# Patient Record
(demographics unavailable — no encounter records)

---

## 2024-11-07 NOTE — RISK ASSESSMENT
[Clinical Records] : Clinical Records [Clinical Interview] : Clinical Interview [Residential stability] : residential stability [Relationship stability] : relationship stability [Employment stability] : employment stability [No] : No [None in the patient's lifetime] : None in the patient's lifetime [None Known] : none known [Feeling of being under threat and being unable to control threat] : feeling of being under threat and being unable to control threat [Yes] : yes [Psychotic disorder] : psychotic disorder [Psychosis] : psychosis [History of Impulsivity] : history of impulsivity [Recent inpatient discharge] : recent inpatient discharge [Triggering events leading to humiliation, shame, and/or despair] : triggering events leading to humiliation, shame, and/or despair (e.g. loss of relationship, financial or health status) (real or anticipated) [Identifies reasons for living] : identifies reasons for living [Supportive social network of family or friends] : supportive social network of family or friends [Roman Catholic beliefs] : Scientology beliefs [Cultural, spiritual and/or moral attitudes against suicide] : cultural, spiritual and/or moral attitudes against suicide [Positive therapeutic relationships] : positive therapeutic relationships [Responsibility to children, family, or others] : responsibility to children, family, or others [Engaged in work or school] : engaged in work or school [Affective dysregulation] : affective dysregulation [Engagement in treatment] : engagement in treatment [de-identified] : Patient denies access to firearms.

## 2024-11-07 NOTE — PHYSICAL EXAM
[Well groomed] : well groomed [Cooperative] : cooperative [Depressed] : depressed [Anxious] : anxious [Full] : full [Clear] : clear [Blocked] : blocked [Depressive] : depressive [Paranoid] : paranoid [None Reported] : none reported [Average] : average [WNL] : within normal limits [Linear/Goal Directed] : linear/goal directed [Reference] : reference

## 2024-11-07 NOTE — SOCIAL HISTORY
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 Developmental History: Milestones met within normal limits. Social Supports: Spouse, parents, siblings and friends. Meaningful Activities: Skiing, softball, volleyball, flag football-6 years ago, dancing, not actively engaging in these activities at present time, Race/Ethnicity: /Bulgarian American Sexual Identity: Heterosexual,  to her spouse since 2019. Spirituality/Shinto: Yazidi.    Spiritual Assessment  What is your elayne or belief? Yazidi. Do you consider yourself spiritual or Jehovah's witness? Rastafarian but not practicing but I do pray. Is there something you believe in that gives meaning to your life? God. Is it important in your life? Yes. What influence does it have on how you take care of yourself? Being a good citizen, supporting others. How have your beliefs influenced your behavior during this illness? Helpful, hopeful, brings me moses to help others, that's my selfcare. What role do your beliefs play in regaining your health? Very much so, always put God first. Are you part of a spiritual or Jehovah's witness community? No. Is this of support to you and how? N/A Is there a person or group of people you really love or who are really important to you? , parents, siblings, son all supportive. We have been discussing your belief and supports. What else gives you internal support? Praying, happiness, doing good deeds. What are your sources of hope, strength, comfort and peace? My family, my mom is Worship and reminds me to pray, I went to [employer] school. What do you hold on to during difficult times? God, guardian kevin, nixon, Geraldine mother of God, I have a Geraldine medal my mom gave me.

## 2024-11-07 NOTE — HEALTH RISK ASSESSMENT
[Patient/Caregiver not ready to engage] : , patient/caregiver not ready to engage [Patient/Collateral not ready to engage] : , patient/collateral not ready to engage [AdvancecareDate] : 11/6/24 [] : 11/6/24

## 2024-11-07 NOTE — RISK ASSESSMENT
[Clinical Records] : Clinical Records [Clinical Interview] : Clinical Interview [Residential stability] : residential stability [Relationship stability] : relationship stability [Employment stability] : employment stability [No] : No [None in the patient's lifetime] : None in the patient's lifetime [None Known] : none known [Feeling of being under threat and being unable to control threat] : feeling of being under threat and being unable to control threat [Yes] : yes [Psychotic disorder] : psychotic disorder [Psychosis] : psychosis [History of Impulsivity] : history of impulsivity [Recent inpatient discharge] : recent inpatient discharge [Triggering events leading to humiliation, shame, and/or despair] : triggering events leading to humiliation, shame, and/or despair (e.g. loss of relationship, financial or health status) (real or anticipated) [Identifies reasons for living] : identifies reasons for living [Supportive social network of family or friends] : supportive social network of family or friends [Restorationism beliefs] : Caodaism beliefs [Cultural, spiritual and/or moral attitudes against suicide] : cultural, spiritual and/or moral attitudes against suicide [Positive therapeutic relationships] : positive therapeutic relationships [Responsibility to children, family, or others] : responsibility to children, family, or others [Engaged in work or school] : engaged in work or school [Affective dysregulation] : affective dysregulation [Engagement in treatment] : engagement in treatment [de-identified] : Patient denies access to firearms.

## 2024-11-07 NOTE — PSYCHOSOCIAL ASSESSMENT
[None known] : None known [Competitive and integrated employment] : Competitive and integrated employment [N/A] : n/a [Other: _____] : [unfilled] [35 hours or more] : 35 hours or more [Financially stable] : financially stable [None] : none [Spouse/Partner] : spouse/partner [Mother] : mother [Good] : good [Lives with Family Member] : lives with family member [No] : Patient attended school, home tutoring, or received education instruction at anytime in the past three months? No [Earned income] : earned income [Private residence (home, apartment, rooming house, hotel, motel, supported housing, supported Single Room Occupancy (SRO),] : Private residence (home, apartment, rooming house, hotel, motel, supported housing, supported Single Room Occupancy (SRO), permanent housing programs, transient housing programs, and shelter plus care housing) [Client's spouse or domestic partner] : client's spouse or domestic partner [Yes] : yes [FreeTextEntry2] : N/A [had nightmares about the event(s) or thought about the event(s) when you did not want] : did not have nightmares and/or unwanted thoughts about the events [tried hard not to think about the event(s) or went out of your way to avoid situations that reminded you of the event] : did not need to avoid thinking about events, did not need to avoid situations that might remind patient of events [has been constantly on guard, watchful, or easily startled] : has not been constantly on guard, watchful, or easily startled [felt numb or detached from people, activities, or your surrounding] : has not felt numb or detached from people, activities, or surroundings [felt guilty or unable to stop blaming yourself or others for the event(s) or any problems the event(s) may have caused] : has not felt guilty or unable to stop blaming self or others for event(s), or any problems the event(s) may have caused [FreeTextEntry6] : Patient reports that she is currently staying with her parents in [place] along with her son, while her home is being remodeled "we found out we have mold". [FreeTextEntry7] : Spouse: Phi Edwards  [phone] [FreeTextEntry8] : Mother: Marlee DePinto [phone] [FreeTextEntry1] : Patient reports she is staying with her parents temporarily, while her home in [place] is being renovated "we have mold".

## 2024-11-07 NOTE — DISCUSSION/SUMMARY
[Low acute suicide risk] : Low acute suicide risk [No] : No [Yes] : Safety Plan completed/updated (for individuals at risk): Yes [FreeTextEntry1] : Session began at 9:17am. Session ended at 10:25am. Patient declined [facility] referral.  All consents have been signed by patient including HIPPA release form for her Spouse: Phi Edwards [phone] and mother: Marlee Xie [phone]. Patient is a 44-year-old  female of American/Macedonian descent, , domiciled with her spouse of 5 years and 3.5-year-old son in [place]. Upon discharge from IPP unit, she has been staying with her parents in [place], along with her son, temporarily as she reports that her house is being remodeled due to having mold. She notes that her spouse is staying in a hotel in [place] as he works as a contractor on [place]. Patient was admitted to [facility] on 10/5/24-10/31-24 due to a psychotic episode, in which she stole a car to reportedly look for signs from God and for a guardian Steffen "I feel connected to God and guardian [place]". Patient reportedly stopped taking her medication from 8/13-10/4/24 "it brings me down, the lightening system in the school bothered me". She reports that her spouse face timed her and met her where she was at the time "he told me to pull over at the ". Patient denies A/V hallucinations. However, she admits to having paranoia "of worldly things that may happen". She reports "sometimes feel like I'm helping the  defeat worldly events, worried [facility] is a target, a lot going on with the migrants, a lot of things concern me". Patient reports having previous IPP admissions at [facility]: [place] in 2011"I was considered missing" and in 2020 an ED visit at [facility] due to paranoia, psychosis "they released me". PMH: Chiari Malformation (blood clotting disorder), patient reports having 3 miscarriages on 9/2019,1/2020 and 4/2014. Patient reports previous outpatient treatment providers as: Mamta Whitaker NP (5 years), virtual sessions and therapist: Tonya Gonzalez (4 months July-October). Current medication: Benztropine 1mg at bedtime, Fluphenazine 2.5mg, and 5mg at bedtime. She reports history of being on Latuda for 10 years. Patient denies history of suicide attempts or self-injurious behavior. She denies history of drug or alcohol use, vaping or tobacco use. She denies history of trauma. No legal issues reported "the  did not press charges it was his car". Patient reports family history of depression-paternal uncle, details unknown. She denies family history of substance abuse or suicide attempts. PHQ9 (score 2, minimal depression, negative) and ANDRE (score o, mild anxiety, somewhat difficult) scales completed with patient. Patient denies suicidal/homicidal ideation, plan or intent. Patient offered IOP services as she states that she is considering taking some time off from work and returning February 2025. Patient also informed that she could join an IOP group once a week for additional support, on a day she is not receiving any other services, and she was receptive to considering this option. She identifies treatment goals as ""I want to work on communication and trying to feel safe in the outside world especially". She notes being a Faith and prays daily. She notes adherence with medication regimen, since discharge from [facility], with good appetite and sleep hygiene. Patient's mother escorted patient to intake this morning and stood in the waiting area, until intake was complete. Assessment Summary: Assessed Needs/ Functional Domain: Depression/Anxiety. Prioritized Assessed Needs:  Mood disorder. Life Goals, Strengths, Barriers, Past Successes: Patient identifies her life goals as "My life goals would be to have a happy marriage, family time and I would like to have another child". She reports have a master's degree in early childhood education, working as a schoolteacher for 20 years. She identifies treatment goals as "I want to work on communication and trying to feel safe in the outside world especially".   Recommendations:  Medication Only:  Individual Therapy Only: [X] Medication and Individual Therapy:  Group Therapy:   [FreeTextEntry3] : Patient denies suicidal ideation, plan or intent and identifies her protective factors as her son,spouse,parents and siblings.

## 2024-11-07 NOTE — PSYCHOSOCIAL ASSESSMENT
[None known] : None known [Competitive and integrated employment] : Competitive and integrated employment [N/A] : n/a [Other: _____] : [unfilled] [35 hours or more] : 35 hours or more [Financially stable] : financially stable [None] : none [Spouse/Partner] : spouse/partner [Mother] : mother [Good] : good [Lives with Family Member] : lives with family member [No] : Patient attended school, home tutoring, or received education instruction at anytime in the past three months? No [Earned income] : earned income [Private residence (home, apartment, rooming house, hotel, motel, supported housing, supported Single Room Occupancy (SRO),] : Private residence (home, apartment, rooming house, hotel, motel, supported housing, supported Single Room Occupancy (SRO), permanent housing programs, transient housing programs, and shelter plus care housing) [Client's spouse or domestic partner] : client's spouse or domestic partner [Yes] : yes [FreeTextEntry2] : N/A [had nightmares about the event(s) or thought about the event(s) when you did not want] : did not have nightmares and/or unwanted thoughts about the events [tried hard not to think about the event(s) or went out of your way to avoid situations that reminded you of the event] : did not need to avoid thinking about events, did not need to avoid situations that might remind patient of events [has been constantly on guard, watchful, or easily startled] : has not been constantly on guard, watchful, or easily startled [felt numb or detached from people, activities, or your surrounding] : has not felt numb or detached from people, activities, or surroundings [felt guilty or unable to stop blaming yourself or others for the event(s) or any problems the event(s) may have caused] : has not felt guilty or unable to stop blaming self or others for event(s), or any problems the event(s) may have caused [FreeTextEntry6] : Patient reports that she is currently staying with her parents in [place] along with her son, while her home is being remodeled "we found out we have mold". [FreeTextEntry7] : Spouse: Phi Edwards  [phone] [FreeTextEntry8] : Mother: Marlee DePinto [phone] [FreeTextEntry1] : Patient reports she is staying with her parents temporarily, while her home in [place] is being renovated "we have mold".

## 2024-11-07 NOTE — FAMILY HISTORY
[FreeTextEntry1] : Family Composition: Patient is  to her spouse of 5 years, and she has a 3.5-year-old son. Family History and Background: Patient was born in [place], raised by her parents, along with her older brother and younger sister. Family Relationship: Patient reports having a "good" relationship with her family. Pertinent Family Medical, MH and Substance Use History including Adult Child of Alcoholic and child of Substance abuse status; history of cancer and heart disease:  Patient denies family history of suicide attempts or substance abuse. She reports her paternal uncle suffered from depression; details unknown.  Mother: HTN. Father: HTN.

## 2024-11-07 NOTE — HISTORY OF PRESENT ILLNESS
[FreeTextEntry1] : Session began at 9:17am. Session ended at 10:25am. Patient declined [facility] referral.  All consents have been signed by patient including HIPPA release form for her Spouse: Phi Edwards [phone] and mother: Marlee Xie [phone]. Patient is a 44-year-old  female of American/Lithuanian descent, , domiciled with her spouse of 5 years and 3.5-year-old son in [place]. Upon discharge from IPP unit, she has been staying with her parents in [place], along with her son, temporarily as she reports that her house is being remodeled due to having mold. She notes that her spouse is staying in a hotel in [place] as he works as a contractor on [place]. Patient was admitted to [facility] on 10/5/24-10/31-24 due to a psychotic episode, in which she stole a car to reportedly look for signs from God and for a guardian Steffen "I feel connected to God and guardian [place]". Patient reportedly stopped taking her medication from 8/13-10/4/24 "it brings me down, the lightening system in the school bothered me". She reports that her spouse face timed her and met her where she was at the time "he told me to pull over at the ". Patient denies A/V hallucinations. However, she admits to having paranoia "of worldly things that may happen". She reports "sometimes feel like I'm helping the  defeat worldly events, worried [facility] is a [facility], a lot going on with the migrants, a lot of things concern me". Patient reports having previous IPP admissions at [facility]: [place] in 2011"I was considered missing" and in 2020 an ED visit at [facility] due to paranoia, psychosis "they released me". PMH: Chiari Malformation (blood clotting disorder), patient reports having 3 miscarriages on 9/2019,1/2020 and 4/2014. Patient reports previous outpatient treatment providers as: Mamta Whitaker NP (5 years), virtual sessions and therapist: Tonya Gonzalez (4 months July-October). Current medication: Benztropine 1mg at bedtime, Fluphenazine 2.5mg, and 5mg at bedtime. She reports history of being on Latuda for 10 years. Patient denies history of suicide attempts or self-injurious behavior. She denies history of drug or alcohol use, vaping or tobacco use. She denies history of trauma. No legal issues reported "the  did not press charges it was his car". Patient reports family history of depression-paternal uncle, details unknown. She denies family history of substance abuse or suicide attempts. PHQ9 (score 2, minimal depression, negative) and ANDRE (score o, mild anxiety, somewhat difficult) scales completed with patient. Patient denies suicidal/homicidal ideation, plan or intent. Patient offered IOP services as she states that she is considering taking some time off from work and returning February 2025. Patient also informed that she could join an IOP group once a week for additional support, on a day she is not receiving any other services, and she was receptive to considering this option. She identifies treatment goals as ""I want to work on communication and trying to feel safe in the outside world especially". She notes being a Samaritan and prays daily. She notes adherence with medication regimen, since discharge from [facility], with good appetite and sleep hygiene. Patient's mother escorted patient to intake this morning and stood in the waiting area, until intake was complete. [FreeTextEntry2] : Session began at 9:17am. Session ended at 10:25am. Patient declined [facility] referral.  All consents have been signed by patient including HIPPA release form for her Spouse: Phi Edwards [phone] and mother: Marlee Xie [phone]. Patient is a 44-year-old  female of American/Portuguese descent, , domiciled with her spouse of 5 years and 3.5-year-old son in [place]. Upon discharge from IPP unit, she has been staying with her parents in [place], along with her son, temporarily as she reports that her house is being remodeled due to having mold. She notes that her spouse is staying in a hotel in [place] as he works as a contractor on [place]. Patient was admitted to [facility] on 10/5/24-10/31-24 due to a psychotic episode, in which she stole a car to reportedly look for signs from God and for a guardian Steffen "I feel connected to God and guardian [place]". Patient reportedly stopped taking her medication from 8/13-10/4/24 "it brings me down, the lightening system in the school bothered me". She reports that her spouse face timed her and met her where she was at the time "he told me to pull over at the ". Patient denies A/V hallucinations. However, she admits to having paranoia "of worldly things that may happen". She reports "sometimes feel like I'm helping the  defeat worldly events, worried [facility] is a target, a lot going on with the migrants, a lot of things concern me". Patient reports having previous IPP admissions at [facility]: [place] in 2011"I was considered missing" and in 2020 an ED visit at [facility] due to paranoia, psychosis "they released me". PMH: Chiari Malformation (blood clotting disorder), patient reports having 3 miscarriages on 9/2019,1/2020 and 4/2014. Patient reports previous outpatient treatment providers as: Mamta Whitaker NP (5 years), virtual sessions and therapist: Tonya Gonzalez (4 months July-October). Current medication: Benztropine 1mg at bedtime, Fluphenazine 2.5mg, and 5mg at bedtime. She reports history of being on Latuda for 10 years. Patient denies history of suicide attempts or self-injurious behavior. She denies history of drug or alcohol use, vaping or tobacco use. She denies history of trauma. No legal issues reported "the  did not press charges it was his car". Patient reports family history of depression-paternal uncle, details unknown. She denies family history of substance abuse or suicide attempts. PHQ9 (score 2, minimal depression, negative) and ANDRE (score o, mild anxiety, somewhat difficult) scales completed with patient. Patient denies suicidal/homicidal ideation, plan or intent. Patient offered IOP services as she states that she is considering taking some time off from work and returning February 2025. Patient also informed that she could join an IOP group once a week for additional support, on a day she is not receiving any other services, and she was receptive to considering this option. She identifies treatment goals as ""I want to work on communication and trying to feel safe in the outside world especially". She notes being a Gnosticism and prays daily. She notes adherence with medication regimen, since discharge from [facility], with good appetite and sleep hygiene. Patient's mother escorted patient to intake this morning and stood in the waiting area, until intake was complete.

## 2024-11-07 NOTE — DISCUSSION/SUMMARY
[Low acute suicide risk] : Low acute suicide risk [No] : No [Yes] : Safety Plan completed/updated (for individuals at risk): Yes [FreeTextEntry1] : Session began at 9:17am. Session ended at 10:25am. Patient declined [facility] referral.  All consents have been signed by patient including HIPPA release form for her Spouse: Phi Edwards [phone] and mother: Marlee Xie [phone]. Patient is a 44-year-old  female of American/Lao descent, , domiciled with her spouse of 5 years and 3.5-year-old son in [place]. Upon discharge from IPP unit, she has been staying with her parents in [place], along with her son, temporarily as she reports that her house is being remodeled due to having mold. She notes that her spouse is staying in a hotel in [place] as he works as a contractor on [place]. Patient was admitted to [facility] on 10/5/24-10/31-24 due to a psychotic episode, in which she stole a car to reportedly look for signs from God and for a guardian Steffen "I feel connected to God and guardian [place]ber". Patient reportedly stopped taking her medication from 8/13-10/4/24 "it brings me down, the lightening system in the school bothered me". She reports that her spouse face timed her and met her where she was at the time "he told me to pull over at the ". Patient denies A/V hallucinations. However, she admits to having paranoia "of worldly things that may happen". She reports "sometimes feel like I'm helping the  defeat worldly events, worried [facility] is a target, a lot going on with the migrants, a lot of things concern me". Patient reports having previous IPP admissions at [facility]: [place] in 2011"I was considered missing" and in 2020 an ED visit at [facility] due to paranoia, psychosis "they released me". PMH: Chiari Malformation (blood clotting disorder), patient reports having 3 miscarriages on 9/2019,1/2020 and 4/2014. Patient reports previous outpatient treatment providers as: Mamta Whitaker NP (5 years), virtual sessions and therapist: Tonya Gonzalez (4 months July-October). Current medication: Benztropine 1mg at bedtime, Fluphenazine 2.5mg, and 5mg at bedtime. She reports history of being on Latuda for 10 years. Patient denies history of suicide attempts or self-injurious behavior. She denies history of drug or alcohol use, vaping or tobacco use. She denies history of trauma. No legal issues reported "the  did not press charges it was his car". Patient reports family history of depression-paternal uncle, details unknown. She denies family history of substance abuse or suicide attempts. PHQ9 (score 2, minimal depression, negative) and ANDRE (score o, mild anxiety, somewhat difficult) scales completed with patient. Patient denies suicidal/homicidal ideation, plan or intent. Patient offered IOP services as she states that she is considering taking some time off from work and returning February 2025. Patient also informed that she could join an IOP group once a week for additional support, on a day she is not receiving any other services, and she was receptive to considering this option. She identifies treatment goals as ""I want to work on communication and trying to feel safe in the outside world especially". She notes being a Nondenominational and prays daily. She notes adherence with medication regimen, since discharge from [facility], with good appetite and sleep hygiene. Patient's mother escorted patient to intake this morning and stood in the waiting area, until intake was complete. Assessment Summary: Assessed Needs/ Functional Domain: Depression/Anxiety. Prioritized Assessed Needs:  Mood disorder. Life Goals, Strengths, Barriers, Past Successes: Patient identifies her life goals as "My life goals would be to have a happy marriage, family time and I would like to have another child". She reports have a master's degree in early childhood education, working as a schoolteacher for 20 years. She identifies treatment goals as "I want to work on communication and trying to feel safe in the outside world especially".   Recommendations:  Medication Only:  Individual Therapy Only: [X] Medication and Individual Therapy:  Group Therapy:   [FreeTextEntry3] : Patient denies suicidal ideation, plan or intent and identifies her protective factors as her son,spouse,parents and siblings.

## 2024-11-07 NOTE — SOCIAL HISTORY
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 Developmental History: Milestones met within normal limits. Social Supports: Spouse, parents, siblings and friends. Meaningful Activities: Skiing, softball, volleyball, flag football-6 years ago, dancing, not actively engaging in these activities at present time, Race/Ethnicity: /Kaz[place] American Sexual Identity: Heterosexual,  to her spouse since 2019. Spirituality/Caodaism: Mormonism.    Spiritual Assessment  What is your elayne or belief? Mormonism. Do you consider yourself spiritual or Worship? Alevism but not practicing but I do pray. Is there something you believe in that gives meaning to your life? God. Is it important in your life? Yes. What influence does it have on how you take care of yourself? Being a good citizen, supporting others. How have your beliefs influenced your behavior during this illness? Helpful, hopeful, brings me moses to help others, that's my selfcare. What role do your beliefs play in regaining your health? Very much so, always put God first. Are you part of a spiritual or Worship community? No. Is this of support to you and how? N/A Is there a person or group of people you really love or who are really important to you? , parents, siblings, son all supportive. We have been discussing your belief and supports. What else gives you internal support? Praying, happiness, doing good deeds. What are your sources of hope, strength, comfort and peace? My family, my mom is Mormonism and reminds me to pray, I went to [employer]. What do you hold on to during difficult times? God, guardian kevin, nixon, Geraldine mother of God, I have a Geraldine medal my mom gave me.

## 2024-11-07 NOTE — REASON FOR VISIT
[Number can be texted] : number can be texted [OK  to leave message] : OK  to leave message [Psychiatric Inpatient] : Psychiatric Inpatient [Patient] : Patient [Prior Medical Records] : Prior Medical Records [[facility] Provider/Facility] : [facility] Provider/Facility [FreeTextEntry3] : [email or web address] [FreeTextEntry5] : English [FreeTextEntry6] : Cassy [FreeTextEntry7] : she/her [FreeTextEntry2] : IPP admission 10/5/24-10/31/24 due to psychosis, schizoaffective disorder. [FreeTextEntry1] : "I want to work on communication and trying to feel safe in the outside world especially".

## 2024-11-07 NOTE — REASON FOR VISIT
[Number can be texted] : number can be texted [OK  to leave message] : OK  to leave message [Psychiatric Inpatient] : Psychiatric Inpatient [Patient] : Patient [Prior Medical Records] : Prior Medical Records [[facility] Provider/Facility] : [facility] Provider/Facility [FreeTextEntry3] : [email or web address] [FreeTextEntry5] : English [FreeTextEntry6] : [facility] [FreeTextEntry7] : she/her [FreeTextEntry2] : IPP admission 10/5/24-10/31/24 due to psychosis, schizoaffective disorder. [FreeTextEntry1] : "I want to work on communication and trying to feel safe in the outside world especially".

## 2024-11-07 NOTE — HISTORY OF PRESENT ILLNESS
[FreeTextEntry1] : Session began at 9:17am. Session ended at 10:25am. Patient declined [facility] referral.  All consents have been signed by patient including HIPPA release form for her Spouse: Phi Edwards [phone] and mother: Marlee Xie [phone]. Patient is a 44-year-old  female of American/Spanish descent, , domiciled with her spouse of 5 years and 3.5-year-old son in [place]. Upon discharge from IPP unit, she has been staying with her parents in [place], along with her son, temporarily as she reports that her house is being remodeled due to having mold. She notes that her spouse is staying in a hotel in [place] as he works as a contractor on [place]. Patient was admitted to [facility] on 10/5/24-10/31-24 due to a psychotic episode, in which she stole a car to reportedly look for signs from God and for a guardian Steffen "I feel connected to God and guardian [place]". Patient reportedly stopped taking her medication from 8/13-10/4/24 "it brings me down, the lightening system in the school bothered me". She reports that her spouse face timed her and met her where she was at the time "he told me to pull over at the ". Patient denies A/V hallucinations. However, she admits to having paranoia "of worldly things that may happen". She reports "sometimes feel like I'm helping the  defeat worldly events, worried [facility] is a [facility], a lot going on with the migrants, a lot of things concern me". Patient reports having previous IPP admissions at [facility] [place] in 2011"I was considered missing" and in 2020 an ED visit at [facility] due to paranoia, psychosis "they released me". PMH: Chiari Malformation (blood clotting disorder), patient reports having 3 miscarriages on 9/2019,1/2020 and 4/2014. Patient reports previous outpatient treatment providers as: Mamta Whitaker NP (5 years), virtual sessions and therapist: Tonya Gonzalez (4 months July-October). Current medication: Benztropine 1mg at bedtime, Fluphenazine 2.5mg, and 5mg at bedtime. She reports history of being on Latuda for 10 years. Patient denies history of suicide attempts or self-injurious behavior. She denies history of drug or alcohol use, vaping or tobacco use. She denies history of trauma. No legal issues reported "the  did not press charges it was his car". Patient reports family history of depression-paternal uncle, details unknown. She denies family history of substance abuse or suicide attempts. PHQ9 (score 2, minimal depression, negative) and ANDRE (score o, mild anxiety, somewhat difficult) scales completed with patient. Patient denies suicidal/homicidal ideation, plan or intent. Patient offered IOP services as she states that she is considering taking some time off from work and returning February 2025. Patient also informed that she could join an IOP group once a week for additional support, on a day she is not receiving any other services, and she was receptive to considering this option. She identifies treatment goals as ""I want to work on communication and trying to feel safe in the outside world especially". She notes being a Religious and prays daily. She notes adherence with medication regimen, since discharge from [facility], with good appetite and sleep hygiene. Patient's mother escorted patient to intake this morning and stood in the waiting area, until intake was complete. [FreeTextEntry2] : Session began at 9:17am. Session ended at 10:25am. Patient declined [facility] referral.  All consents have been signed by patient including HIPPA release form for her Spouse: Phi Edwards [phone] and mother: Marlee Xie [phone]. Patient is a 44-year-old  female of American/Khmer descent, , domiciled with her spouse of 5 years and 3.5-year-old son in [place]. Upon discharge from IPP unit, she has been staying with her parents in [place], along with her son, temporarily as she reports that her house is being remodeled due to having mold. She notes that her spouse is staying in a hotel in [place] as he works as a contractor on [place]. Patient was admitted to [facility] on 10/5/24-10/31-24 due to a psychotic episode, in which she stole a car to reportedly look for signs from God and for a guardian Steffen "I feel connected to God and guardian [place]". Patient reportedly stopped taking her medication from 8/13-10/4/24 "it brings me down, the lightening system in the school bothered me". She reports that her spouse face timed her and met her where she was at the time "he told me to pull over at the ". Patient denies A/V hallucinations. However, she admits to having paranoia "of worldly things that may happen". She reports "sometimes feel like I'm helping the  defeat worldly events, worried [facility] is a [facility], a lot going on with the migrants, a lot of things concern me". Patient reports having previous IPP admissions at [facility]: [place] in 2011"I was considered missing" and in 2020 an ED visit at [facility] due to paranoia, psychosis "they released me". PMH: Chiari Malformation (blood clotting disorder), patient reports having 3 miscarriages on 9/2019,1/2020 and 4/2014. Patient reports previous outpatient treatment providers as: Mamta Whitaker NP (5 years), virtual sessions and therapist: Tonya Gonzalez (4 months July-October). Current medication: Benztropine 1mg at bedtime, Fluphenazine 2.5mg, and 5mg at bedtime. She reports history of being on Latuda for 10 years. Patient denies history of suicide attempts or self-injurious behavior. She denies history of drug or alcohol use, vaping or tobacco use. She denies history of trauma. No legal issues reported "the  did not press charges it was his car". Patient reports family history of depression-paternal uncle, details unknown. She denies family history of substance abuse or suicide attempts. PHQ9 (score 2, minimal depression, negative) and ANDRE (score o, mild anxiety, somewhat difficult) scales completed with patient. Patient denies suicidal/homicidal ideation, plan or intent. Patient offered IOP services as she states that she is considering taking some time off from work and returning February 2025. Patient also informed that she could join an IOP group once a week for additional support, on a day she is not receiving any other services, and she was receptive to considering this option. She identifies treatment goals as ""I want to work on communication and trying to feel safe in the outside world especially". She notes being a Uatsdin and prays daily. She notes adherence with medication regimen, since discharge from [facility], with good appetite and sleep hygiene. Patient's mother escorted patient to intake this morning and stood in the waiting area, until intake was complete.

## 2024-11-07 NOTE — FAMILY HISTORY
[FreeTextEntry1] : Family Composition: Patient is  to her spouse of 5 years, and she has a 3.5-year-old son. Family History and Background: Patient was born in [place], raised by her parents, along with her older brother and younger sister. Family Relationship: Patient reports having a "good" relationship with her family. Pertinent Family Medical, MH and Substance Use History including Adult Child of Alcoholic and child of Substance abuse status; history of cancer and heart disease:  Patient denies family history of suicide attempts or substance abuse. She reports her paternal uncle suffered from depression; details unknown.  Mother: HTN. Father: HTN.

## 2024-11-13 NOTE — RISK ASSESSMENT
[Clinical Records] : Clinical Records [Clinical Interview] : Clinical Interview [No] : No [Psychotic disorder] : psychotic disorder [Psychosis] : psychosis [History of Impulsivity] : history of impulsivity [Recent inpatient discharge] : recent inpatient discharge [Triggering events leading to humiliation, shame, and/or despair] : triggering events leading to humiliation, shame, and/or despair (e.g. loss of relationship, financial or health status) (real or anticipated) [Identifies reasons for living] : identifies reasons for living [Supportive social network of family or friends] : supportive social network of family or friends [Scientology beliefs] : Uatsdin beliefs [Cultural, spiritual and/or moral attitudes against suicide] : cultural, spiritual and/or moral attitudes against suicide [Positive therapeutic relationships] : positive therapeutic relationships [Responsibility to children, family, or others] : responsibility to children, family, or others [Engaged in work or school] : engaged in work or school [None in the patient's lifetime] : None in the patient's lifetime [None Known] : none known [Feeling of being under threat and being unable to control threat] : feeling of being under threat and being unable to control threat [Affective dysregulation] : affective dysregulation [Residential stability] : residential stability [Relationship stability] : relationship stability [Employment stability] : employment stability [Engagement in treatment] : engagement in treatment [Yes] : yes [de-identified] : Patient denies access to firearms.

## 2024-11-13 NOTE — HISTORY OF PRESENT ILLNESS
[FreeTextEntry1] : Today, pt presents for completion of intake evaluation with psychiatrist. States she has been "good" since being discharged, however that she has not started work which she cites as major stressor. Notes when she is at work, the lights there bother her, triggering her to believe she is on a special mission to help the  keep the country safe. Notes that she still has these thoughts at times, though currently she states she is able to understand this is not the case, but when she is walking on the streets or riding in a car, for example, that she'll feel like people are trying to direct her in one direction or another and she struggles with who she should listen and pay attention to. Denies AVH, denies other paranoid thoughts. Reports she is sleeping and eating well, denies low mood or other depressive, anxious or manic symptoms at this time.   Per pt's , Mason, [phone], reports Cassy continues to not be herself, stating "her mind and her body are not present", reports difficulty concentrating while having a conversation with her, however denies she is a harm to herself or others. Notes she is completing ADLs and caring for herself. Sleeping and eating well. Reports work to be stressor for her, but denies other acute stressors/trigger. Is not aware why pt decided to stop taking medication.   Per initial intake appointment: Patient is a 44-year-old  female of American/Danish descent, , domiciled with her spouse of 5 years and 3.5-year-old son in [place]. Upon discharge from [facility], she has been staying with her parents in [place], along with her son, temporarily as she reports that her house is being remodeled due to having mold. She notes that her spouse is staying in a hotel in [place] as he works as a contractor on [place]. Patient was admitted to [facility] on 10/5/24-10/31-24 due to a psychotic episode, in which she stole a car to reportedly look for signs from God and for a guardian Steffen "I feel connected to God and guardian Coldfoot". Patient reportedly stopped taking her medication from 8/13-10/4/24 "it brings me down, the lightening system in the school bothered me". She reports that her spouse face timed her and met her where she was at the time "he told me to pull over at the ". Patient denies A/V hallucinations. However, she admits to having paranoia "of worldly things that may happen". She reports "sometimes feel like I'm helping the  defeat worldly events, worried [facility] is a target, a lot going on with the migrants, a lot of things concern me". Patient reports having previous IPP admissions at [facility]: [place] in 2011"I was considered missing" and in 2020 an ED visit at [facility] due to paranoia, psychosis "they released me". PMH: Chiari Malformation (blood clotting disorder), patient reports having 3 miscarriages on 9/2019,1/2020 and 4/2014. Patient reports previous outpatient treatment providers as: Mamta Whitaker NP (5 years), virtual sessions and therapist: Tonya Gonzalez (4 months July-October). Current medication: Benztropine 1mg at bedtime, Fluphenazine 2.5mg, and 5mg at bedtime. She reports history of being on Latuda for 10 years. Patient denies history of suicide attempts or self-injurious behavior. She denies history of drug or alcohol use, vaping or tobacco use. She denies history of trauma. No legal issues reported "the  did not press charges it was his car". Patient reports family history of depression-paternal uncle, details unknown. She denies family history of substance abuse or suicide attempts. PHQ9 (score 2, minimal depression, negative) and ANDRE (score o, mild anxiety, somewhat difficult) scales completed with patient. Patient denies suicidal/homicidal ideation, plan or intent. Patient offered IOP services as she states that she is considering taking some time off from work and returning February 2025. Patient also informed that she could join an IOP group once a week for additional support, on a day she is not receiving any other services, and she was receptive to considering this option. She identifies treatment goals as ""I want to work on communication and trying to feel safe in the outside world especially". She notes being a Moravian and prays daily. She notes adherence with medication regimen, since discharge from IPP, with good appetite and sleep hygiene. Patient's mother escorted patient to intake this morning and stood in the waiting area, until intake was complete. [FreeTextEntry2] : Session began at 9:17am. Session ended at 10:25am. Patient declined [facility] referral.  All consents have been signed by patient including HIPPA release form for her Spouse: Phi Edwards [phone] and mother: Marlee Xie [phone]. Patient is a 44-year-old  female of American/Latvian descent, , domiciled with her spouse of 5 years and 3.5-year-old son in [place]. Upon discharge from IPP unit, she has been staying with her parents in [place], along with her son, temporarily as she reports that her house is being remodeled due to having mold. She notes that her spouse is staying in a hotel in [place] as he works as a contractor on [place]. Patient was admitted to [facility] on 10/5/24-10/31-24 due to a psychotic episode, in which she stole a car to reportedly look for signs from God and for a guardian Steffen "I feel connected to God and guardian Talmage". Patient reportedly stopped taking her medication from 8/13-10/4/24 "it brings me down, the lightening system in the school bothered me". She reports that her spouse face timed her and met her where she was at the time "he told me to pull over at the ". Patient denies A/V hallucinations. However, she admits to having paranoia "of worldly things that may happen". She reports "sometimes feel like I'm helping the  defeat worldly events, worried [facility] is a target, a lot going on with the migrants, a lot of things concern me". Patient reports having previous IPP admissions at [facility]: [place] in 2011"I was considered missing" and in 2020 an ED visit at [facility] due to paranoia, psychosis "they released me". PMH: Chiari Malformation (blood clotting disorder), patient reports having 3 miscarriages on 9/2019,1/2020 and 4/2014. Patient reports previous outpatient treatment providers as: Mamta Whitaker NP (5 years), virtual sessions and therapist: Tonya Gonzalez (4 months July-October). Current medication: Benztropine 1mg at bedtime, Fluphenazine 2.5mg, and 5mg at bedtime. She reports history of being on Latuda for 10 years. Patient denies history of suicide attempts or self-injurious behavior. She denies history of drug or alcohol use, vaping or tobacco use. She denies history of trauma. No legal issues reported "the  did not press charges it was his car". Patient reports family history of depression-paternal uncle, details unknown. She denies family history of substance abuse or suicide attempts. PHQ9 (score 2, minimal depression, negative) and ANDRE (score o, mild anxiety, somewhat difficult) scales completed with patient. Patient denies suicidal/homicidal ideation, plan or intent. Patient offered IOP services as she states that she is considering taking some time off from work and returning February 2025. Patient also informed that she could join an IOP group once a week for additional support, on a day she is not receiving any other services, and she was receptive to considering this option. She identifies treatment goals as ""I want to work on communication and trying to feel safe in the outside world especially". She notes being a Methodist and prays daily. She notes adherence with medication regimen, since discharge from [facility], with good appetite and sleep hygiene. Patient's mother escorted patient to intake this morning and stood in the waiting area, until intake was complete.

## 2024-11-13 NOTE — REASON FOR VISIT
[Number can be texted] : number can be texted [OK  to leave message] : OK  to leave message [FreeTextEntry3] : [email or web address] [FreeTextEntry5] : English [FreeTextEntry6] : Cassy [FreeTextEntry7] : she/her [Psychiatric Inpatient] : Psychiatric Inpatient [[facility] Provider/Facility] : [facility] Provider/Facility [Patient] : Patient [Prior Medical Records] : Prior Medical Records [FreeTextEntry2] : IPP admission 10/5/24-10/31/24 due to psychosis, schizoaffective disorder. [FreeTextEntry1] : "I want to work on communication and trying to feel safe in the outside world especially".

## 2024-11-13 NOTE — DISCUSSION/SUMMARY
[Low acute suicide risk] : Low acute suicide risk [No] : No [FreeTextEntry1] : Pt is at low acute risk for self harm given no current SI, no past SI/SA or other self harming behavior, has strong family and social support, has housing and financial stability, has good access to care, is engaged in care, is currently medication compliant, has no current or past substance use history, has no access to guns, and is able to engage in safety planning.  [FreeTextEntry3] : Patient denies suicidal ideation, plan or intent and identifies her protective factors as her son,spouse,parents and siblings.

## 2024-11-13 NOTE — PSYCHOSOCIAL ASSESSMENT
[None known] : None known [FreeTextEntry2] : N/A [Other: _____] : [unfilled] [had nightmares about the event(s) or thought about the event(s) when you did not want] : did not have nightmares and/or unwanted thoughts about the events [tried hard not to think about the event(s) or went out of your way to avoid situations that reminded you of the event] : did not need to avoid thinking about events, did not need to avoid situations that might remind patient of events [has been constantly on guard, watchful, or easily startled] : has not been constantly on guard, watchful, or easily startled [felt numb or detached from people, activities, or your surrounding] : has not felt numb or detached from people, activities, or surroundings [felt guilty or unable to stop blaming yourself or others for the event(s) or any problems the event(s) may have caused] : has not felt guilty or unable to stop blaming self or others for event(s), or any problems the event(s) may have caused [Competitive and integrated employment] : Competitive and integrated employment [35 hours or more] : 35 hours or more [Earned income] : earned income [Financially stable] : financially stable [None] : none [Private residence (home, apartment, rooming house, hotel, motel, supported housing, supported Single Room Occupancy (SRO),] : Private residence (home, apartment, rooming house, hotel, motel, supported housing, supported Single Room Occupancy (SRO), permanent housing programs, transient housing programs, and shelter plus care housing) [Client's spouse or domestic partner] : client's spouse or domestic partner [Yes] : yes [N/A] : n/a [Spouse/Partner] : spouse/partner [Mother] : mother [Good] : good [Lives with Family Member] : lives with family member [No] : Patient has personal representation (legal guardian, representative payee, conservatorship)? No [FreeTextEntry6] : Patient reports that she is currently staying with her parents in [place] along with her son, while her home is being remodeled "we found out we have mold". [FreeTextEntry7] : Spouse: Phi Edwards  [phone] [FreeTextEntry8] : Mother: Marlee DePinto [phone] [FreeTextEntry1] : Patient reports she is staying with her parents temporarily, while her home in [place] is being renovated "we have mold".

## 2024-11-13 NOTE — PLAN
[Admit to Program     (Add Program Admission information to a new column in the Admit/Discharge Flowsheet)] : Admit to program [FreeTextEntry4] : - increase prolixin from 2.5mg qam and 5mg qPM to 5mg BID to address psychosis  - continue cogentin - f/u 1 month with provider - continue therapy with Ilyssa ([phone] -- HIPPA release signed)

## 2024-11-13 NOTE — HEALTH RISK ASSESSMENT
[Patient/Caregiver not ready to engage] : , patient/caregiver not ready to engage [AdvancecareDate] : 11/6/24 [Patient/Collateral not ready to engage] : , patient/collateral not ready to engage

## 2024-11-13 NOTE — PHYSICAL EXAM
[Well groomed] : well groomed [Cooperative] : cooperative [Anxious] : anxious [Full] : full [Clear] : clear [Linear/Goal Directed] : linear/goal directed [None] : none [Reference] : reference [Grandeur] : grandeur [Average] : average [WNL] : within normal limits [6 - Severely ill] : 6 - Severely ill  (disruptive pathology, behavior and function are frequently influenced by symptoms, may require assistance from others) [4 - No change] : 4 - No change  (symptoms remain essentially unchanged)

## 2024-11-13 NOTE — SOCIAL HISTORY
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 Developmental History: Milestones met within normal limits. Social Supports: Spouse, parents, siblings and friends. Meaningful Activities: Skiing, softball, volleyball, flag football-6 years ago, dancing, not actively engaging in these activities at present time, Race/Ethnicity: /Khmer American Sexual Identity: Heterosexual,  to her spouse since 2019. Spirituality/Islam: Gnosticism.    Spiritual Assessment  What is your elayne or belief? Gnosticism. Do you consider yourself spiritual or Latter-day? Episcopal but not practicing but I do pray. Is there something you believe in that gives meaning to your life? God. Is it important in your life? Yes. What influence does it have on how you take care of yourself? Being a good citizen, supporting others. How have your beliefs influenced your behavior during this illness? Helpful, hopeful, brings me moses to help others, that's my selfcare. What role do your beliefs play in regaining your health? Very much so, always put God first. Are you part of a spiritual or Latter-day community? No. Is this of support to you and how? N/A Is there a person or group of people you really love or who are really important to you? , parents, siblings, son all supportive. We have been discussing your belief and supports. What else gives you internal support? Praying, happiness, doing good deeds. What are your sources of hope, strength, comfort and peace? My family, my mom is Congregation and reminds me to pray, I went to Church school. What do you hold on to during difficult times? God, guardian kevin, nixon, Geraldine mother of God, I have a Geraldine medal my mom gave me.

## 2024-11-13 NOTE — FAMILY HISTORY
[FreeTextEntry1] : Family Composition: Patient is  to her spouse of 5 years, and she has a 3.5-year-old son. Family History and Background: Patient was born in [place], raised by her parents, along with her older brother and younger sister. Family Relationship: Patient reports having a "good" relationship with her family. Pertinent Family Medical, MH and Substance Use History including Adult Child of Alcoholic and child of Substance abuse status; history of cancer and heart disease:  Patient denies family history of suicide attempts or substance abuse. She reports her paternal uncle suffered from depression; details unknown.  Mother: HTN. Father: HTN.

## 2024-12-04 NOTE — RISK ASSESSMENT
[Clinical Records] : Clinical Records [Clinical Interview] : Clinical Interview [No] : No [Psychotic disorder] : psychotic disorder [Psychosis] : psychosis [History of Impulsivity] : history of impulsivity [Recent inpatient discharge] : recent inpatient discharge [Triggering events leading to humiliation, shame, and/or despair] : triggering events leading to humiliation, shame, and/or despair (e.g. loss of relationship, financial or health status) (real or anticipated) [Identifies reasons for living] : identifies reasons for living [Supportive social network of family or friends] : supportive social network of family or friends [Buddhist beliefs] : Zoroastrian beliefs [Cultural, spiritual and/or moral attitudes against suicide] : cultural, spiritual and/or moral attitudes against suicide [Positive therapeutic relationships] : positive therapeutic relationships [Responsibility to children, family, or others] : responsibility to children, family, or others [Engaged in work or school] : engaged in work or school [None in the patient's lifetime] : None in the patient's lifetime [None Known] : none known [Feeling of being under threat and being unable to control threat] : feeling of being under threat and being unable to control threat [Affective dysregulation] : affective dysregulation [Residential stability] : residential stability [Relationship stability] : relationship stability [Employment stability] : employment stability [Engagement in treatment] : engagement in treatment [Yes] : yes [de-identified] : Patient denies access to firearms.

## 2024-12-04 NOTE — REASON FOR VISIT
[Patient preference] : as per patient preference [Telehealth (audio & video) - Individual/Group] : This visit was provided via telehealth using real-time 2-way audio visual technology. [Medical Office: ([facility])___] : The provider was located at the medical office in [unfilled]. [Home] : The patient, [unfilled], was located at home, [unfilled], at the time of the visit. [Participant(s) identity verified] : Participant(s) identity verified. [Verbal consent obtained from patient/other participant(s)] : Verbal consent for telehealth/telephonic services obtained from patient/other participant(s) [FreeTextEntry4] : 10:35 [Number can be texted] : number can be texted [OK  to leave message] : OK  to leave message [FreeTextEntry3] : [email or web address] [FreeTextEntry5] : English [FreeTextEntry6] : Cassy [FreeTextEntry7] : she/her [Psychiatric Inpatient] : Psychiatric Inpatient [[facility] Provider/Facility] : [facility] Provider/Facility [Patient] : Patient [Prior Medical Records] : Prior Medical Records [FreeTextEntry2] : IPP admission 10/5/24-10/31/24 due to psychosis, schizoaffective disorder. [FreeTextEntry1] : "I want to work on communication and trying to feel safe in the outside world especially".

## 2024-12-04 NOTE — HISTORY OF PRESENT ILLNESS
[FreeTextEntry1] : Today, pt presents for completion of intake evaluation with psychiatrist. States she has been "good" since being discharged, however that she has not started work which she cites as major stressor. Notes when she is at work, the lights there bother her, triggering her to believe she is on a special mission to help the  keep the country safe. Notes that she still has these thoughts at times, though currently she states she is able to understand this is not the case, but when she is walking on the streets or riding in a car, for example, that she'll feel like people are trying to direct her in one direction or another and she struggles with who she should listen and pay attention to. Denies AVH, denies other paranoid thoughts. Reports she is sleeping and eating well, denies low mood or other depressive, anxious or manic symptoms at this time.   Per pt's , Mason, [phone], reports Cassy continues to not be herself, stating "her mind and her body are not present", reports difficulty concentrating while having a conversation with her, however denies she is a harm to herself or others. Notes she is completing ADLs and caring for herself. Sleeping and eating well. Reports work to be stressor for her, but denies other acute stressors/trigger. Is not aware why pt decided to stop taking medication.   Per initial intake appointment: Patient is a 44-year-old  female of American/Mongolian descent, , domiciled with her spouse of 5 years and 3.5-year-old son in [place]. Upon discharge from [facility] unit, she has been staying with her parents in [place], along with her son, temporarily as she reports that her house is being remodeled due to having mold. She notes that her spouse is staying in a hotel in [place] as he works as a contractor on [place]. Patient was admitted to [facility] on 10/5/24-10/31-24 due to a psychotic episode, in which she stole a car to reportedly look for signs from God and for a guardian Steffen "I feel connected to God and guardian Champ". Patient reportedly stopped taking her medication from 8/13-10/4/24 "it brings me down, the lightening system in the school bothered me". She reports that her spouse face timed her and met her where she was at the time "he told me to pull over at the ". Patient denies A/V hallucinations. However, she admits to having paranoia "of worldly things that may happen". She reports "sometimes feel like I'm helping the  defeat worldly events, worried [facility] is a target, a lot going on with the migrants, a lot of things concern me". Patient reports having previous IPP admissions at [facility]: [place] in 2011"I was considered missing" and in 2020 an ED visit at [facility] due to paranoia, psychosis "they released me". PMH: Chiari Malformation (blood clotting disorder), patient reports having 3 miscarriages on 9/2019,1/2020 and 4/2014. Patient reports previous outpatient treatment providers as: Mamta Whitaker NP (5 years), virtual sessions and therapist: Tonya Gonzalez (4 months July-October). Current medication: Benztropine 1mg at bedtime, Fluphenazine 2.5mg, and 5mg at bedtime. She reports history of being on Latuda for 10 years. Patient denies history of suicide attempts or self-injurious behavior. She denies history of drug or alcohol use, vaping or tobacco use. She denies history of trauma. No legal issues reported "the  did not press charges it was his car". Patient reports family history of depression-paternal uncle, details unknown. She denies family history of substance abuse or suicide attempts. PHQ9 (score 2, minimal depression, negative) and ANDRE (score o, mild anxiety, somewhat difficult) scales completed with patient. Patient denies suicidal/homicidal ideation, plan or intent. Patient offered IOP services as she states that she is considering taking some time off from work and returning February 2025. Patient also informed that she could join an IOP group once a week for additional support, on a day she is not receiving any other services, and she was receptive to considering this option. She identifies treatment goals as ""I want to work on communication and trying to feel safe in the outside world especially". She notes being a Sabianist and prays daily. She notes adherence with medication regimen, since discharge from IPP, with good appetite and sleep hygiene. Patient's mother escorted patient to intake this morning and stood in the waiting area, until intake was complete. [FreeTextEntry2] : Session began at 9:17am. Session ended at 10:25am. Patient declined [facility] referral.  All consents have been signed by patient including HIPPA release form for her Spouse: Phi Edwards [phone] and mother: Marlee Xie [phone]. Patient is a 44-year-old  female of American/Turkish descent, , domiciled with her spouse of 5 years and 3.5-year-old son in [place]. Upon discharge from IPP unit, she has been staying with her parents in [place], along with her son, temporarily as she reports that her house is being remodeled due to having mold. She notes that her spouse is staying in a hotel in [place] as he works as a contractor on [place]. Patient was admitted to [facility] on 10/5/24-10/31-24 due to a psychotic episode, in which she stole a car to reportedly look for signs from God and for a guardian Steffen "I feel connected to God and guardian Craig". Patient reportedly stopped taking her medication from 8/13-10/4/24 "it brings me down, the lightening system in the school bothered me". She reports that her spouse face timed her and met her where she was at the time "he told me to pull over at the ". Patient denies A/V hallucinations. However, she admits to having paranoia "of worldly things that may happen". She reports "sometimes feel like I'm helping the  defeat worldly events, worried [facility] is a target, a lot going on with the migrants, a lot of things concern me". Patient reports having previous IPP admissions at [facility]: [place] in 2011"I was considered missing" and in 2020 an ED visit at [facility] due to paranoia, psychosis "they released me". PMH: Chiari Malformation (blood clotting disorder), patient reports having 3 miscarriages on 9/2019,1/2020 and 4/2014. Patient reports previous outpatient treatment providers as: Mamta Whitaker NP (5 years), virtual sessions and therapist: Tonya Gonzalez (4 months July-October). Current medication: Benztropine 1mg at bedtime, Fluphenazine 2.5mg, and 5mg at bedtime. She reports history of being on Latuda for 10 years. Patient denies history of suicide attempts or self-injurious behavior. She denies history of drug or alcohol use, vaping or tobacco use. She denies history of trauma. No legal issues reported "the  did not press charges it was his car". Patient reports family history of depression-paternal uncle, details unknown. She denies family history of substance abuse or suicide attempts. PHQ9 (score 2, minimal depression, negative) and ANDRE (score o, mild anxiety, somewhat difficult) scales completed with patient. Patient denies suicidal/homicidal ideation, plan or intent. Patient offered IOP services as she states that she is considering taking some time off from work and returning February 2025. Patient also informed that she could join an IOP group once a week for additional support, on a day she is not receiving any other services, and she was receptive to considering this option. She identifies treatment goals as ""I want to work on communication and trying to feel safe in the outside world especially". She notes being a Anabaptism and prays daily. She notes adherence with medication regimen, since discharge from [facility], with good appetite and sleep hygiene. Patient's mother escorted patient to intake this morning and stood in the waiting area, until intake was complete.

## 2024-12-04 NOTE — PLAN
[FreeTextEntry5] : -increase prolixin from 5mg bid to 5mg qAM and 7.5mg qPM -f/u in 4 weeks [Admit to Program     (Add Program Admission information to a new column in the Admit/Discharge Flowsheet)] : Admit to program [FreeTextEntry4] : - increase prolixin from 2.5mg qam and 5mg qPM to 5mg BID to address psychosis  - continue cogentin - f/u 1 month with provider - continue therapy with Ilyssa ([phone] -- HIPPA release signed)

## 2024-12-04 NOTE — FAMILY HISTORY
[FreeTextEntry1] : Family Composition: Patient is  to her spouse of 5 years, and she has a 3.5-year-old son. Family History and Background: Patient was born in [place], raised by her parents, along with her older brother and younger sister. Family Relationship: Patient reports having a "good" relationship with her family. Pertinent Family Medical, MH and Substance Use History including Adult Child of Alcoholic and child of Substance abuse status; history of cancer and heart disease:  Patient denies family history of suicide attempts or substance abuse. She reports her paternal uncle suffered from depression; details unknown.  Mother: HTN. Father: HTN.

## 2024-12-04 NOTE — PSYCHOSOCIAL ASSESSMENT
[None known] : None known [FreeTextEntry2] : N/A [Other: _____] : [unfilled] [had nightmares about the event(s) or thought about the event(s) when you did not want] : did not have nightmares and/or unwanted thoughts about the events [tried hard not to think about the event(s) or went out of your way to avoid situations that reminded you of the event] : did not need to avoid thinking about events, did not need to avoid situations that might remind patient of events [has been constantly on guard, watchful, or easily startled] : has not been constantly on guard, watchful, or easily startled [felt numb or detached from people, activities, or your surrounding] : has not felt numb or detached from people, activities, or surroundings [felt guilty or unable to stop blaming yourself or others for the event(s) or any problems the event(s) may have caused] : has not felt guilty or unable to stop blaming self or others for event(s), or any problems the event(s) may have caused [Competitive and integrated employment] : Competitive and integrated employment [35 hours or more] : 35 hours or more [Earned income] : earned income [Financially stable] : financially stable [None] : none [Private residence (home, apartment, rooming house, hotel, motel, supported housing, supported Single Room Occupancy (SRO),] : Private residence (home, apartment, rooming house, hotel, motel, supported housing, supported Single Room Occupancy (SRO), permanent housing programs, transient housing programs, and shelter plus care housing) [Client's spouse or domestic partner] : client's spouse or domestic partner [Yes] : yes [N/A] : n/a [Spouse/Partner] : spouse/partner [Mother] : mother [Good] : good [Lives with Family Member] : lives with family member [No] : Patient has personal representation (legal guardian, representative payee, conservatorship)? No [FreeTextEntry6] : Patient reports that she is currently staying with her parents in [place] along with her son, while her home is being remodeled "we found out we have mold". [FreeTextEntry7] : Spouse: Phi Edwards  [phone] [FreeTextEntry8] : Mother: Marlee DePinto [phone] [FreeTextEntry1] : Patient reports she is staying with her parents temporarily, while her home in [place] is being renovated "we have mold".

## 2024-12-04 NOTE — PHYSICAL EXAM
[Well groomed] : well groomed [Cooperative] : cooperative [Anxious] : anxious [Constricted] : constricted [Clear] : clear [Linear/Goal Directed] : linear/goal directed [None] : none [Reference] : reference [Grandeur] : grandeur [Average] : average [WNL] : within normal limits [6 - Severely ill] : 6 - Severely ill  (disruptive pathology, behavior and function are frequently influenced by symptoms, may require assistance from others) [4 - No change] : 4 - No change  (symptoms remain essentially unchanged)

## 2024-12-04 NOTE — SOCIAL HISTORY
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 Developmental History: Milestones met within normal limits. Social Supports: Spouse, parents, siblings and friends. Meaningful Activities: Skiing, softball, volleyball, flag football-6 years ago, dancing, not actively engaging in these activities at present time, Race/Ethnicity: /Greenlandic American Sexual Identity: Heterosexual,  to her spouse since 2019. Spirituality/Restorationism: Baptism.    Spiritual Assessment  What is your elayne or belief? Baptism. Do you consider yourself spiritual or Restorationist? Muslim but not practicing but I do pray. Is there something you believe in that gives meaning to your life? God. Is it important in your life? Yes. What influence does it have on how you take care of yourself? Being a good citizen, supporting others. How have your beliefs influenced your behavior during this illness? Helpful, hopeful, brings me moses to help others, that's my selfcare. What role do your beliefs play in regaining your health? Very much so, always put God first. Are you part of a spiritual or Restorationist community? No. Is this of support to you and how? N/A Is there a person or group of people you really love or who are really important to you? , parents, siblings, son all supportive. We have been discussing your belief and supports. What else gives you internal support? Praying, happiness, doing good deeds. What are your sources of hope, strength, comfort and peace? My family, my mom is Christianity and reminds me to pray, I went to Spiritism school. What do you hold on to during difficult times? God, guardian kevin, nixon, Geraldine mother of God, I have a Geraldine medal my mom gave me.

## 2025-01-08 NOTE — PSYCHOSOCIAL ASSESSMENT
[None known] : None known [Other: _____] : [unfilled] [Competitive and integrated employment] : Competitive and integrated employment [35 hours or more] : 35 hours or more [Earned income] : earned income [Financially stable] : financially stable [None] : none [Private residence (home, apartment, rooming house, hotel, motel, supported housing, supported Single Room Occupancy (SRO),] : Private residence (home, apartment, rooming house, hotel, motel, supported housing, supported Single Room Occupancy (SRO), permanent housing programs, transient housing programs, and shelter plus care housing) [Client's spouse or domestic partner] : client's spouse or domestic partner [Yes] : yes [N/A] : n/a [Spouse/Partner] : spouse/partner [Mother] : mother [Good] : good [Lives with Family Member] : lives with family member [No] : Patient has personal representation (legal guardian, representative payee, conservatorship)? No [FreeTextEntry2] : N/A [had nightmares about the event(s) or thought about the event(s) when you did not want] : did not have nightmares and/or unwanted thoughts about the events [tried hard not to think about the event(s) or went out of your way to avoid situations that reminded you of the event] : did not need to avoid thinking about events, did not need to avoid situations that might remind patient of events [has been constantly on guard, watchful, or easily startled] : has not been constantly on guard, watchful, or easily startled [felt numb or detached from people, activities, or your surrounding] : has not felt numb or detached from people, activities, or surroundings [felt guilty or unable to stop blaming yourself or others for the event(s) or any problems the event(s) may have caused] : has not felt guilty or unable to stop blaming self or others for event(s), or any problems the event(s) may have caused [FreeTextEntry6] : Patient reports that she is currently staying with her parents in [place] along with her son, while her home is being remodeled "we found out we have mold". [FreeTextEntry7] : Spouse: Phi Edwards  [phone] [FreeTextEntry8] : Mother: Marlee DePinto [phone] [FreeTextEntry1] : Patient reports she is staying with her parents temporarily, while her home in [place] is being renovated "we have mold".

## 2025-01-08 NOTE — REASON FOR VISIT
[Number can be texted] : number can be texted [OK  to leave message] : OK  to leave message [FreeTextEntry3] : [email or web address] [FreeTextEntry5] : English [FreeTextEntry6] : Cassy [FreeTextEntry7] : she/her [Psychiatric Inpatient] : Psychiatric Inpatient [[facility] Provider/Facility] : [facility] Provider/Facility [Patient] : Patient [Prior Medical Records] : Prior Medical Records [Collateral - Name/Contact Info/Relationship:___] : Collateral: [unfilled] [FreeTextEntry2] : IPP admission 10/5/24-10/31/24 due to psychosis, schizoaffective disorder. [FreeTextEntry1] : "I want to work on communication and trying to feel safe in the outside world especially".

## 2025-01-08 NOTE — HISTORY OF PRESENT ILLNESS
[FreeTextEntry1] : Today, pt presents for completion of intake evaluation with psychiatrist. States she has been "good" since being discharged, however that she has not started work which she cites as major stressor. Notes when she is at work, the lights there bother her, triggering her to believe she is on a special mission to help the  keep the country safe. Notes that she still has these thoughts at times, though currently she states she is able to understand this is not the case, but when she is walking on the streets or riding in a car, for example, that she'll feel like people are trying to direct her in one direction or another and she struggles with who she should listen and pay attention to. Denies AVH, denies other paranoid thoughts. Reports she is sleeping and eating well, denies low mood or other depressive, anxious or manic symptoms at this time.   Per pt's , Mason, [phone], reports Cassy continues to not be herself, stating "her mind and her body are not present", reports difficulty concentrating while having a conversation with her, however denies she is a harm to herself or others. Notes she is completing ADLs and caring for herself. Sleeping and eating well. Reports work to be stressor for her, but denies other acute stressors/trigger. Is not aware why pt decided to stop taking medication.   Per initial intake appointment: Patient is a 44-year-old  female of American/Faroese descent, , domiciled with her spouse of 5 years and 3.5-year-old son in [place]. Upon discharge from [facility] unit, she has been staying with her parents in [place], along with her son, temporarily as she reports that her house is being remodeled due to having mold. She notes that her spouse is staying in a hotel in [place] as he works as a contractor on [place]. Patient was admitted to [facility] on 10/5/24-10/31-24 due to a psychotic episode, in which she stole a car to reportedly look for signs from God and for a guardian Steffen "I feel connected to God and guardian [place]". Patient reportedly stopped taking her medication from 8/13-10/4/24 "it brings me down, the lightening system in the school bothered me". She reports that her spouse face timed her and met her where she was at the time "he told me to pull over at the ". Patient denies A/V hallucinations. However, she admits to having paranoia "of worldly things that may happen". She reports "sometimes feel like I'm helping the  defeat worldly events, worried [facility] is a target, a lot going on with the migrants, a lot of things concern me". Patient reports having previous IPP admissions at [facility] [place] in 2011"I was considered missing" and in 2020 an ED visit at [facility] due to paranoia, psychosis "they released me". PMH: Chiari Malformation (blood clotting disorder), patient reports having 3 miscarriages on 9/2019,1/2020 and 4/2014. Patient reports previous outpatient treatment providers as: Mamta Whitaker NP (5 years), virtual sessions and therapist: Tonya Gonzalez (4 months July-October). Current medication: Benztropine 1mg at bedtime, Fluphenazine 2.5mg, and 5mg at bedtime. She reports history of being on Latuda for 10 years. Patient denies history of suicide attempts or self-injurious behavior. She denies history of drug or alcohol use, vaping or tobacco use. She denies history of trauma. No legal issues reported "the  did not press charges it was his car". Patient reports family history of depression-paternal uncle, details unknown. She denies family history of substance abuse or suicide attempts. PHQ9 (score 2, minimal depression, negative) and ANDRE (score o, mild anxiety, somewhat difficult) scales completed with patient. Patient denies suicidal/homicidal ideation, plan or intent. Patient offered IOP services as she states that she is considering taking some time off from work and returning February 2025. Patient also informed that she could join an IOP group once a week for additional support, on a day she is not receiving any other services, and she was receptive to considering this option. She identifies treatment goals as ""I want to work on communication and trying to feel safe in the outside world especially". She notes being a Denominational and prays daily. She notes adherence with medication regimen, since discharge from IPP, with good appetite and sleep hygiene. Patient's mother escorted patient to intake this morning and stood in the waiting area, until intake was complete. [FreeTextEntry2] : Session began at 9:17am. Session ended at 10:25am. Patient declined [facility] referral.  All consents have been signed by patient including HIPPA release form for her Spouse: Phi Edwards [phone] and mother: Marlee Xie [phone]. Patient is a 44-year-old  female of American/Ukrainian descent, , domiciled with her spouse of 5 years and 3.5-year-old son in [place]. Upon discharge from IPP unit, she has been staying with her parents in [place], along with her son, temporarily as she reports that her house is being remodeled due to having mold. She notes that her spouse is staying in a hotel in [place] as he works as a contractor on [place]. Patient was admitted to [facility] on 10/5/24-10/31-24 due to a psychotic episode, in which she stole a car to reportedly look for signs from God and for a guardian Steffen "I feel connected to God and guardian [place]". Patient reportedly stopped taking her medication from 8/13-10/4/24 "it brings me down, the lightening system in the school bothered me". She reports that her spouse face timed her and met her where she was at the time "he told me to pull over at the ". Patient denies A/V hallucinations. However, she admits to having paranoia "of worldly things that may happen". She reports "sometimes feel like I'm helping the  defeat worldly events, worried [facility] is a target, a lot going on with the migrants, a lot of things concern me". Patient reports having previous IPP admissions at [facility] [place] in 2011"I was considered missing" and in 2020 an ED visit at [facility] due to paranoia, psychosis "they released me". PMH: Chiari Malformation (blood clotting disorder), patient reports having 3 miscarriages on 9/2019,1/2020 and 4/2014. Patient reports previous outpatient treatment providers as: Mamta Whitaker NP (5 years), virtual sessions and therapist: Tonya Gonzalez (4 months July-October). Current medication: Benztropine 1mg at bedtime, Fluphenazine 2.5mg, and 5mg at bedtime. She reports history of being on Latuda for 10 years. Patient denies history of suicide attempts or self-injurious behavior. She denies history of drug or alcohol use, vaping or tobacco use. She denies history of trauma. No legal issues reported "the  did not press charges it was his car". Patient reports family history of depression-paternal uncle, details unknown. She denies family history of substance abuse or suicide attempts. PHQ9 (score 2, minimal depression, negative) and ANDRE (score o, mild anxiety, somewhat difficult) scales completed with patient. Patient denies suicidal/homicidal ideation, plan or intent. Patient offered IOP services as she states that she is considering taking some time off from work and returning February 2025. Patient also informed that she could join an IOP group once a week for additional support, on a day she is not receiving any other services, and she was receptive to considering this option. She identifies treatment goals as ""I want to work on communication and trying to feel safe in the outside world especially". She notes being a Rastafarian and prays daily. She notes adherence with medication regimen, since discharge from [facility], with good appetite and sleep hygiene. Patient's mother escorted patient to intake this morning and stood in the waiting area, until intake was complete.

## 2025-01-08 NOTE — FAMILY HISTORY
[FreeTextEntry1] : Family Composition: Patient is  to her spouse of 5 years, and she has a 3.5-year-old son. Family History and Background: Patient was born in [place], raised by her parents, along with her older brother and younger sister. Family Relationship: Patient reports having a "good" relationship with her family. Pertinent Family Medical, MH and Substance Use History including Adult Child of Alcoholic and child of Substance abuse status; history of cancer and heart disease:  Patient denies family history of suicide attempts or substance abuse. She reports her paternal uncle suffered from depression; details unknown.  Mother: HTN. Father: HTN.

## 2025-01-08 NOTE — HEALTH RISK ASSESSMENT
[Patient/Caregiver not ready to engage] : , patient/caregiver not ready to engage [Patient/Collateral not ready to engage] : , patient/collateral not ready to engage [AdvancecareDate] : 11/6/24

## 2025-01-08 NOTE — HISTORY OF PRESENT ILLNESS
[FreeTextEntry1] : Today, pt presents for completion of intake evaluation with psychiatrist. States she has been "good" since being discharged, however that she has not started work which she cites as major stressor. Notes when she is at work, the lights there bother her, triggering her to believe she is on a special mission to help the  keep the country safe. Notes that she still has these thoughts at times, though currently she states she is able to understand this is not the case, but when she is walking on the streets or riding in a car, for example, that she'll feel like people are trying to direct her in one direction or another and she struggles with who she should listen and pay attention to. Denies AVH, denies other paranoid thoughts. Reports she is sleeping and eating well, denies low mood or other depressive, anxious or manic symptoms at this time.   Per pt's , Mason, [phone], reports Cassy continues to not be herself, stating "her mind and her body are not present", reports difficulty concentrating while having a conversation with her, however denies she is a harm to herself or others. Notes she is completing ADLs and caring for herself. Sleeping and eating well. Reports work to be stressor for her, but denies other acute stressors/trigger. Is not aware why pt decided to stop taking medication.   Per initial intake appointment: Patient is a 44-year-old  female of American/Serbian descent, , domiciled with her spouse of 5 years and 3.5-year-old son in [place]. Upon discharge from [facility] unit, she has been staying with her parents in [place], along with her son, temporarily as she reports that her house is being remodeled due to having mold. She notes that her spouse is staying in a hotel in [place] as he works as a contractor on [place]. Patient was admitted to [facility] on 10/5/24-10/31-24 due to a psychotic episode, in which she stole a car to reportedly look for signs from God and for a guardian Steffen "I feel connected to God and guardian [place]". Patient reportedly stopped taking her medication from 8/13-10/4/24 "it brings me down, the lightening system in the school bothered me". She reports that her spouse face timed her and met her where she was at the time "he told me to pull over at the ". Patient denies A/V hallucinations. However, she admits to having paranoia "of worldly things that may happen". She reports "sometimes feel like I'm helping the  defeat worldly events, worried [facility] is a target, a lot going on with the migrants, a lot of things concern me". Patient reports having previous IPP admissions at [facility] [place] in 2011"I was considered missing" and in 2020 an ED visit at [facility] due to paranoia, psychosis "they released me". PMH: Chiari Malformation (blood clotting disorder), patient reports having 3 miscarriages on 9/2019,1/2020 and 4/2014. Patient reports previous outpatient treatment providers as: Mamta Whitaker NP (5 years), virtual sessions and therapist: Tonya Gonzalez (4 months July-October). Current medication: Benztropine 1mg at bedtime, Fluphenazine 2.5mg, and 5mg at bedtime. She reports history of being on Latuda for 10 years. Patient denies history of suicide attempts or self-injurious behavior. She denies history of drug or alcohol use, vaping or tobacco use. She denies history of trauma. No legal issues reported "the  did not press charges it was his car". Patient reports family history of depression-paternal uncle, details unknown. She denies family history of substance abuse or suicide attempts. PHQ9 (score 2, minimal depression, negative) and ANDRE (score o, mild anxiety, somewhat difficult) scales completed with patient. Patient denies suicidal/homicidal ideation, plan or intent. Patient offered IOP services as she states that she is considering taking some time off from work and returning February 2025. Patient also informed that she could join an IOP group once a week for additional support, on a day she is not receiving any other services, and she was receptive to considering this option. She identifies treatment goals as ""I want to work on communication and trying to feel safe in the outside world especially". She notes being a Anglican and prays daily. She notes adherence with medication regimen, since discharge from IPP, with good appetite and sleep hygiene. Patient's mother escorted patient to intake this morning and stood in the waiting area, until intake was complete. [FreeTextEntry2] : Session began at 9:17am. Session ended at 10:25am. Patient declined [facility] referral.  All consents have been signed by patient including HIPPA release form for her Spouse: Phi Edwards [phone] and mother: Marlee Xie [phone]. Patient is a 44-year-old  female of American/Frisian descent, , domiciled with her spouse of 5 years and 3.5-year-old son in [place]. Upon discharge from IPP unit, she has been staying with her parents in [place], along with her son, temporarily as she reports that her house is being remodeled due to having mold. She notes that her spouse is staying in a hotel in [place] as he works as a contractor on [place]. Patient was admitted to [facility] on 10/5/24-10/31-24 due to a psychotic episode, in which she stole a car to reportedly look for signs from God and for a guardian Steffen "I feel connected to God and guardian [place]". Patient reportedly stopped taking her medication from 8/13-10/4/24 "it brings me down, the lightening system in the school bothered me". She reports that her spouse face timed her and met her where she was at the time "he told me to pull over at the ". Patient denies A/V hallucinations. However, she admits to having paranoia "of worldly things that may happen". She reports "sometimes feel like I'm helping the  defeat worldly events, worried [facility] is a target, a lot going on with the migrants, a lot of things concern me". Patient reports having previous IPP admissions at [facility] [place] in 2011"I was considered missing" and in 2020 an ED visit at [facility] due to paranoia, psychosis "they released me". PMH: Chiari Malformation (blood clotting disorder), patient reports having 3 miscarriages on 9/2019,1/2020 and 4/2014. Patient reports previous outpatient treatment providers as: Mamta Whitaker NP (5 years), virtual sessions and therapist: Tonya Gonzalez (4 months July-October). Current medication: Benztropine 1mg at bedtime, Fluphenazine 2.5mg, and 5mg at bedtime. She reports history of being on Latuda for 10 years. Patient denies history of suicide attempts or self-injurious behavior. She denies history of drug or alcohol use, vaping or tobacco use. She denies history of trauma. No legal issues reported "the  did not press charges it was his car". Patient reports family history of depression-paternal uncle, details unknown. She denies family history of substance abuse or suicide attempts. PHQ9 (score 2, minimal depression, negative) and ANDRE (score o, mild anxiety, somewhat difficult) scales completed with patient. Patient denies suicidal/homicidal ideation, plan or intent. Patient offered IOP services as she states that she is considering taking some time off from work and returning February 2025. Patient also informed that she could join an IOP group once a week for additional support, on a day she is not receiving any other services, and she was receptive to considering this option. She identifies treatment goals as ""I want to work on communication and trying to feel safe in the outside world especially". She notes being a Episcopal and prays daily. She notes adherence with medication regimen, since discharge from [facility], with good appetite and sleep hygiene. Patient's mother escorted patient to intake this morning and stood in the waiting area, until intake was complete.

## 2025-01-08 NOTE — PLAN
[FreeTextEntry5] : -decrease prolixin from 5mg qAM and 2.5mg qPM to 2.5mg po BID, and discontinue morning dose after 1 week, and discontinue PM dose after another week -start seroquel XR 300mg po qd -therapy referral -f/u in 2 weeks -currently on leave of absence from work (teaching) [Admit to Program     (Add Program Admission information to a new column in the Admit/Discharge Flowsheet)] : Admit to program [FreeTextEntry4] : - increase prolixin from 2.5mg qam and 5mg qPM to 5mg BID to address psychosis  - continue cogentin - f/u 1 month with provider - continue therapy with Ilyssa ([phone] -- HIPPA release signed)

## 2025-01-08 NOTE — SOCIAL HISTORY
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 Developmental History: Milestones met within normal limits. Social Supports: Spouse, parents, siblings and friends. Meaningful Activities: Skiing, softball, volleyball, flag football-6 years ago, dancing, not actively engaging in these activities at present time, Race/Ethnicity: /Korean American Sexual Identity: Heterosexual,  to her spouse since 2019. Spirituality/Spiritism: Tenriism.    Spiritual Assessment  What is your elayne or belief? Tenriism. Do you consider yourself spiritual or Anglican? Yarsanism but not practicing but I do pray. Is there something you believe in that gives meaning to your life? God. Is it important in your life? Yes. What influence does it have on how you take care of yourself? Being a good citizen, supporting others. How have your beliefs influenced your behavior during this illness? Helpful, hopeful, brings me moses to help others, that's my selfcare. What role do your beliefs play in regaining your health? Very much so, always put God first. Are you part of a spiritual or Anglican community? No. Is this of support to you and how? N/A Is there a person or group of people you really love or who are really important to you? , parents, siblings, son all supportive. We have been discussing your belief and supports. What else gives you internal support? Praying, happiness, doing good deeds. What are your sources of hope, strength, comfort and peace? My family, my mom is Advent and reminds me to pray, I went to Pentec[employer] school. What do you hold on to during difficult times? God, guardian kevin, nixon, Geraldine mother of God, I have a Geraldine medal my mom gave me.

## 2025-01-08 NOTE — PHYSICAL EXAM
[Well groomed] : well groomed [Cooperative] : cooperative [Euthymic] : euthymic [Constricted] : constricted [Clear] : clear [Linear/Goal Directed] : linear/goal directed [None] : none [None Reported] : none reported [Average] : average [WNL] : within normal limits [4 - Moderately ill] : 4 - Moderately ill  (overt symptoms causing noticeable, but modest, functional impairment or distress; symptom level may warrant medication) [4 - No change] : 4 - No change  (symptoms remain essentially unchanged)

## 2025-01-08 NOTE — RISK ASSESSMENT
[Clinical Records] : Clinical Records [Clinical Interview] : Clinical Interview [No] : No [Psychotic disorder] : psychotic disorder [Psychosis] : psychosis [History of Impulsivity] : history of impulsivity [Recent inpatient discharge] : recent inpatient discharge [Triggering events leading to humiliation, shame, and/or despair] : triggering events leading to humiliation, shame, and/or despair (e.g. loss of relationship, financial or health status) (real or anticipated) [Identifies reasons for living] : identifies reasons for living [Supportive social network of family or friends] : supportive social network of family or friends [Gnosticist beliefs] : Congregational beliefs [Cultural, spiritual and/or moral attitudes against suicide] : cultural, spiritual and/or moral attitudes against suicide [Positive therapeutic relationships] : positive therapeutic relationships [Responsibility to children, family, or others] : responsibility to children, family, or others [Engaged in work or school] : engaged in work or school [None in the patient's lifetime] : None in the patient's lifetime [None Known] : none known [Feeling of being under threat and being unable to control threat] : feeling of being under threat and being unable to control threat [Affective dysregulation] : affective dysregulation [Residential stability] : residential stability [Relationship stability] : relationship stability [Employment stability] : employment stability [Engagement in treatment] : engagement in treatment [Yes] : yes [de-identified] : Patient denies access to firearms.

## 2025-01-08 NOTE — RISK ASSESSMENT
[Clinical Records] : Clinical Records [Clinical Interview] : Clinical Interview [No] : No [Psychotic disorder] : psychotic disorder [Psychosis] : psychosis [History of Impulsivity] : history of impulsivity [Recent inpatient discharge] : recent inpatient discharge [Triggering events leading to humiliation, shame, and/or despair] : triggering events leading to humiliation, shame, and/or despair (e.g. loss of relationship, financial or health status) (real or anticipated) [Identifies reasons for living] : identifies reasons for living [Supportive social network of family or friends] : supportive social network of family or friends [Pentecostal beliefs] : Sabianist beliefs [Cultural, spiritual and/or moral attitudes against suicide] : cultural, spiritual and/or moral attitudes against suicide [Positive therapeutic relationships] : positive therapeutic relationships [Responsibility to children, family, or others] : responsibility to children, family, or others [Engaged in work or school] : engaged in work or school [None in the patient's lifetime] : None in the patient's lifetime [None Known] : none known [Feeling of being under threat and being unable to control threat] : feeling of being under threat and being unable to control threat [Affective dysregulation] : affective dysregulation [Residential stability] : residential stability [Relationship stability] : relationship stability [Employment stability] : employment stability [Engagement in treatment] : engagement in treatment [Yes] : yes [de-identified] : Patient denies access to firearms.

## 2025-01-08 NOTE — HISTORY OF PRESENT ILLNESS
[FreeTextEntry1] : Today, pt presents for completion of intake evaluation with psychiatrist. States she has been "good" since being discharged, however that she has not started work which she cites as major stressor. Notes when she is at work, the lights there bother her, triggering her to believe she is on a special mission to help the  keep the country safe. Notes that she still has these thoughts at times, though currently she states she is able to understand this is not the case, but when she is walking on the streets or riding in a car, for example, that she'll feel like people are trying to direct her in one direction or another and she struggles with who she should listen and pay attention to. Denies AVH, denies other paranoid thoughts. Reports she is sleeping and eating well, denies low mood or other depressive, anxious or manic symptoms at this time.   Per pt's , Mason, [phone], reports Cassy continues to not be herself, stating "her mind and her body are not present", reports difficulty concentrating while having a conversation with her, however denies she is a harm to herself or others. Notes she is completing ADLs and caring for herself. Sleeping and eating well. Reports work to be stressor for her, but denies other acute stressors/trigger. Is not aware why pt decided to stop taking medication.   Per initial intake appointment: Patient is a 44-year-old  female of American/Arabic descent, , domiciled with her spouse of 5 years and 3.5-year-old son in [place]. Upon discharge from [facility] unit, she has been staying with her parents in [place], along with her son, temporarily as she reports that her house is being remodeled due to having mold. She notes that her spouse is staying in a hotel in [place] as he works as a contractor on [place]. Patient was admitted to [facility] on 10/5/24-10/31-24 due to a psychotic episode, in which she stole a car to reportedly look for signs from God and for a guardian Steffen "I feel connected to God and guardian [place]". Patient reportedly stopped taking her medication from 8/13-10/4/24 "it brings me down, the lightening system in the school bothered me". She reports that her spouse face timed her and met her where she was at the time "he told me to pull over at the ". Patient denies A/V hallucinations. However, she admits to having paranoia "of worldly things that may happen". She reports "sometimes feel like I'm helping the  defeat worldly events, worried [facility] is a target, a lot going on with the migrants, a lot of things concern me". Patient reports having previous IPP admissions at [facility] [place] in 2011"I was considered missing" and in 2020 an ED visit at [facility] due to paranoia, psychosis "they released me". PMH: Chiari Malformation (blood clotting disorder), patient reports having 3 miscarriages on 9/2019,1/2020 and 4/2014. Patient reports previous outpatient treatment providers as: Mamta Whitaker NP (5 years), virtual sessions and therapist: Tonya Gonzalez (4 months July-October). Current medication: Benztropine 1mg at bedtime, Fluphenazine 2.5mg, and 5mg at bedtime. She reports history of being on Latuda for 10 years. Patient denies history of suicide attempts or self-injurious behavior. She denies history of drug or alcohol use, vaping or tobacco use. She denies history of trauma. No legal issues reported "the  did not press charges it was his car". Patient reports family history of depression-paternal uncle, details unknown. She denies family history of substance abuse or suicide attempts. PHQ9 (score 2, minimal depression, negative) and ANDRE (score o, mild anxiety, somewhat difficult) scales completed with patient. Patient denies suicidal/homicidal ideation, plan or intent. Patient offered IOP services as she states that she is considering taking some time off from work and returning February 2025. Patient also informed that she could join an IOP group once a week for additional support, on a day she is not receiving any other services, and she was receptive to considering this option. She identifies treatment goals as ""I want to work on communication and trying to feel safe in the outside world especially". She notes being a Shinto and prays daily. She notes adherence with medication regimen, since discharge from IPP, with good appetite and sleep hygiene. Patient's mother escorted patient to intake this morning and stood in the waiting area, until intake was complete. [FreeTextEntry2] : Session began at 9:17am. Session ended at 10:25am. Patient declined [facility] referral.  All consents have been signed by patient including HIPPA release form for her Spouse: Phi Edwards [phone] and mother: Marlee Xei [phone]. Patient is a 44-year-old  female of American/Maltese descent, , domiciled with her spouse of 5 years and 3.5-year-old son in [place]. Upon discharge from IPP unit, she has been staying with her parents in [place], along with her son, temporarily as she reports that her house is being remodeled due to having mold. She notes that her spouse is staying in a hotel in [place] as he works as a contractor on [place]. Patient was admitted to [facility] on 10/5/24-10/31-24 due to a psychotic episode, in which she stole a car to reportedly look for signs from God and for a guardian Steffen "I feel connected to God and guardian [place]". Patient reportedly stopped taking her medication from 8/13-10/4/24 "it brings me down, the lightening system in the school bothered me". She reports that her spouse face timed her and met her where she was at the time "he told me to pull over at the ". Patient denies A/V hallucinations. However, she admits to having paranoia "of worldly things that may happen". She reports "sometimes feel like I'm helping the  defeat worldly events, worried [facility] is a target, a lot going on with the migrants, a lot of things concern me". Patient reports having previous IPP admissions at [facility] [place] in 2011"I was considered missing" and in 2020 an ED visit at [facility] due to paranoia, psychosis "they released me". PMH: Chiari Malformation (blood clotting disorder), patient reports having 3 miscarriages on 9/2019,1/2020 and 4/2014. Patient reports previous outpatient treatment providers as: Mamta Whitaker NP (5 years), virtual sessions and therapist: Tonya Gonzalez (4 months July-October). Current medication: Benztropine 1mg at bedtime, Fluphenazine 2.5mg, and 5mg at bedtime. She reports history of being on Latuda for 10 years. Patient denies history of suicide attempts or self-injurious behavior. She denies history of drug or alcohol use, vaping or tobacco use. She denies history of trauma. No legal issues reported "the  did not press charges it was his car". Patient reports family history of depression-paternal uncle, details unknown. She denies family history of substance abuse or suicide attempts. PHQ9 (score 2, minimal depression, negative) and ANDRE (score o, mild anxiety, somewhat difficult) scales completed with patient. Patient denies suicidal/homicidal ideation, plan or intent. Patient offered IOP services as she states that she is considering taking some time off from work and returning February 2025. Patient also informed that she could join an IOP group once a week for additional support, on a day she is not receiving any other services, and she was receptive to considering this option. She identifies treatment goals as ""I want to work on communication and trying to feel safe in the outside world especially". She notes being a Mu-ism and prays daily. She notes adherence with medication regimen, since discharge from [facility], with good appetite and sleep hygiene. Patient's mother escorted patient to intake this morning and stood in the waiting area, until intake was complete.

## 2025-01-08 NOTE — SOCIAL HISTORY
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 Developmental History: Milestones met within normal limits. Social Supports: Spouse, parents, siblings and friends. Meaningful Activities: Skiing, softball, volleyball, flag football-6 years ago, dancing, not actively engaging in these activities at present time, Race/Ethnicity: /Ukrainian American Sexual Identity: Heterosexual,  to her spouse since 2019. Spirituality/Evangelical: Bahai.    Spiritual Assessment  What is your elayne or belief? Bahai. Do you consider yourself spiritual or Jew? Oriental orthodox but not practicing but I do pray. Is there something you believe in that gives meaning to your life? God. Is it important in your life? Yes. What influence does it have on how you take care of yourself? Being a good citizen, supporting others. How have your beliefs influenced your behavior during this illness? Helpful, hopeful, brings me moses to help others, that's my selfcare. What role do your beliefs play in regaining your health? Very much so, always put God first. Are you part of a spiritual or Jew community? No. Is this of support to you and how? N/A Is there a person or group of people you really love or who are really important to you? , parents, siblings, son all supportive. We have been discussing your belief and supports. What else gives you internal support? Praying, happiness, doing good deeds. What are your sources of hope, strength, comfort and peace? My family, my mom is Druze and reminds me to pray, I went to [employer]. What do you hold on to during difficult times? God, guardian kevin, nixon, Geraldine mother of God, I have a Geraldine medal my mom gave me.

## 2025-01-08 NOTE — SOCIAL HISTORY
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 Developmental History: Milestones met within normal limits. Social Supports: Spouse, parents, siblings and friends. Meaningful Activities: Skiing, softball, volleyball, flag football-6 years ago, dancing, not actively engaging in these activities at present time, Race/Ethnicity: /German American Sexual Identity: Heterosexual,  to her spouse since 2019. Spirituality/Yazidism: Episcopalian.    Spiritual Assessment  What is your elayne or belief? Episcopalian. Do you consider yourself spiritual or Mu-ism? Jain but not practicing but I do pray. Is there something you believe in that gives meaning to your life? God. Is it important in your life? Yes. What influence does it have on how you take care of yourself? Being a good citizen, supporting others. How have your beliefs influenced your behavior during this illness? Helpful, hopeful, brings me moses to help others, that's my selfcare. What role do your beliefs play in regaining your health? Very much so, always put God first. Are you part of a spiritual or Mu-ism community? No. Is this of support to you and how? N/A Is there a person or group of people you really love or who are really important to you? , parents, siblings, son all supportive. We have been discussing your belief and supports. What else gives you internal support? Praying, happiness, doing good deeds. What are your sources of hope, strength, comfort and peace? My family, my mom is Amish and reminds me to pray, I went to [employer]. What do you hold on to during difficult times? God, guardian kevin, nixon, Geraldine mother of God, I have a Geraldine medal my mom gave me.

## 2025-01-08 NOTE — PSYCHOSOCIAL ASSESSMENT
[None known] : None known [FreeTextEntry2] : N/A [Other: _____] : [unfilled] [had nightmares about the event(s) or thought about the event(s) when you did not want] : did not have nightmares and/or unwanted thoughts about the events [tried hard not to think about the event(s) or went out of your way to avoid situations that reminded you of the event] : did not need to avoid thinking about events, did not need to avoid situations that might remind patient of events [has been constantly on guard, watchful, or easily startled] : has not been constantly on guard, watchful, or easily startled [felt numb or detached from people, activities, or your surrounding] : has not felt numb or detached from people, activities, or surroundings [felt guilty or unable to stop blaming yourself or others for the event(s) or any problems the event(s) may have caused] : has not felt guilty or unable to stop blaming self or others for event(s), or any problems the event(s) may have caused [Competitive and integrated employment] : Competitive and integrated employment [35 hours or more] : 35 hours or more [Earned income] : earned income [Financially stable] : financially stable [None] : none [Private residence (home, apartment, rooming house, hotel, motel, supported housing, supported Single Room Occupancy (SRO),] : Private residence (home, apartment, rooming house, hotel, motel, supported housing, supported Single Room Occupancy (SRO), permanent housing programs, transient housing programs, and shelter plus care housing) [Client's spouse or domestic partner] : client's spouse or domestic partner [Yes] : yes [N/A] : n/a [Spouse/Partner] : spouse/partner [Mother] : mother [Good] : good [Lives with Family Member] : lives with family member [No] : Patient has personal representation (legal guardian, representative payee, conservatorship)? No [FreeTextEntry6] : Patient reports that she is currently staying with her parents in [place] along with her son, while her home is being remodeled "we found out we have mold". [FreeTextEntry7] : Spouse: Phi Edwards  [phone] [FreeTextEntry8] : Mother: Marlee DePinto [phone] [FreeTextEntry1] : Patient reports she is staying with her parents temporarily, while her home in [place] is being renovated "we have mold".

## 2025-01-08 NOTE — FAMILY HISTORY
[FreeTextEntry1] : Family Composition: Patient is  to her spouse of 5 years, and she has a 3.5-year-old son. Family History and Background: Patient was born in [place], raised by her parents, along with her older brother and younger sister. Family Relationship: Patient reports having a "good" relationship with her family. Pertinent Family Medical, MH and Substance Use History including Adult Child of Alcoholic and child of Substance abuse status; history of cancer and heart disease:  Patient denies family history of suicide attempts or substance abuse. She reports her paternal uncle suffered from depression; details unknown.  Mother: HTN. Father: HTN.

## 2025-01-08 NOTE — FAMILY HISTORY
[FreeTextEntry1] : Family Composition: Patient is  to her spouse of 5 years, and she has a 3.5-year-old son. Family History and Background: Patient was born in [place], raised by her parents, along with her older brother and younger sister. Family Relationship: Patient reports having a "good" relationship with her family. Pertinent Family Medical, MH and Substance Use History including Adult Child of Alcoholic and child of Substance abuse status; history of cancer and heart disease:  Patient denies family history of suicide attempts or substance abuse. She reports her paternal uncle suffered from depression; details unknown.  Mother: HTN. Father: HTN.

## 2025-01-08 NOTE — RISK ASSESSMENT
[Clinical Records] : Clinical Records [Clinical Interview] : Clinical Interview [No] : No [Psychotic disorder] : psychotic disorder [Psychosis] : psychosis [History of Impulsivity] : history of impulsivity [Recent inpatient discharge] : recent inpatient discharge [Triggering events leading to humiliation, shame, and/or despair] : triggering events leading to humiliation, shame, and/or despair (e.g. loss of relationship, financial or health status) (real or anticipated) [Identifies reasons for living] : identifies reasons for living [Supportive social network of family or friends] : supportive social network of family or friends [Judaism beliefs] : Amish beliefs [Cultural, spiritual and/or moral attitudes against suicide] : cultural, spiritual and/or moral attitudes against suicide [Positive therapeutic relationships] : positive therapeutic relationships [Responsibility to children, family, or others] : responsibility to children, family, or others [Engaged in work or school] : engaged in work or school [None in the patient's lifetime] : None in the patient's lifetime [None Known] : none known [Feeling of being under threat and being unable to control threat] : feeling of being under threat and being unable to control threat [Affective dysregulation] : affective dysregulation [Residential stability] : residential stability [Relationship stability] : relationship stability [Employment stability] : employment stability [Engagement in treatment] : engagement in treatment [Yes] : yes [de-identified] : Patient denies access to firearms.

## 2025-01-08 NOTE — REASON FOR VISIT
[Patient preference] : as per patient preference [Telehealth (audio & video) - Individual/Group] : This visit was provided via telehealth using real-time 2-way audio visual technology. [Medical Office: ([facility])___] : The provider was located at the medical office in [unfilled]. [Home] : The patient, [unfilled], was located at home, [unfilled], at the time of the visit. [Participant(s) identity verified] : Participant(s) identity verified. [Verbal consent obtained from patient/other participant(s)] : Verbal consent for telehealth/telephonic services obtained from patient/other participant(s) [Number can be texted] : number can be texted [OK  to leave message] : OK  to leave message [Psychiatric Inpatient] : Psychiatric Inpatient [[facility] Provider/Facility] : [facility] Provider/Facility [Patient] : Patient [Prior Medical Records] : Prior Medical Records [FreeTextEntry4] : 10:35 [FreeTextEntry3] : [email or web address] [FreeTextEntry5] : English [FreeTextEntry6] : Cassy [FreeTextEntry7] : she/her [FreeTextEntry2] : IPP admission 10/5/24-10/31/24 due to psychosis, schizoaffective disorder. [FreeTextEntry1] : "I want to work on communication and trying to feel safe in the outside world especially".

## 2025-01-08 NOTE — PLAN
[Admit to Program     (Add Program Admission information to a new column in the Admit/Discharge Flowsheet)] : Admit to program [FreeTextEntry4] : Maintain functional and mood stability. [FreeTextEntry5] : -decrease prolixin from 5mg BID to 2.5mg po qAM and 5mg qPM -f/u in 1 week

## 2025-01-08 NOTE — PSYCHOSOCIAL ASSESSMENT
[None known] : None known [Other: _____] : [unfilled] [Competitive and integrated employment] : Competitive and integrated employment [35 hours or more] : 35 hours or more [Earned income] : earned income [Financially stable] : financially stable [None] : none [Private residence (home, apartment, rooming house, hotel, motel, supported housing, supported Single Room Occupancy (SRO),] : Private residence (home, apartment, rooming house, hotel, motel, supported housing, supported Single Room Occupancy (SRO), permanent housing programs, transient housing programs, and shelter plus care housing) [Client's spouse or domestic partner] : client's spouse or domestic partner [Yes] : yes [N/A] : n/a [Spouse/Partner] : spouse/partner [Mother] : mother [Good] : good [Lives with Family Member] : lives with family member [No] : Patient has personal representation (legal guardian, representative payee, conservatorship)? No [FreeTextEntry2] : N/A [had nightmares about the event(s) or thought about the event(s) when you did not want] : did not have nightmares and/or unwanted thoughts about the events [tried hard not to think about the event(s) or went out of your way to avoid situations that reminded you of the event] : did not need to avoid thinking about events, did not need to avoid situations that might remind patient of events [has been constantly on guard, watchful, or easily startled] : has not been constantly on guard, watchful, or easily startled [felt numb or detached from people, activities, or your surrounding] : has not felt numb or detached from people, activities, or surroundings [felt guilty or unable to stop blaming yourself or others for the event(s) or any problems the event(s) may have caused] : has not felt guilty or unable to stop blaming self or others for event(s), or any problems the event(s) may have caused [FreeTextEntry6] : Patient reports that she is currently staying with her parents in [place] along with her son, while her home is being remodeled "we found out we have mold". [FreeTextEntry7] : Spouse: Phi Edwards  [phone] [FreeTextEntry8] : Mother: Marlee DePinto [phone] [FreeTextEntry1] : Patient reports she is staying with her parents temporarily, while her home in [place] is being renovated "we have mold".

## 2025-01-08 NOTE — REASON FOR VISIT
[Patient preference] : as per patient preference [Telehealth (audio & video) - Individual/Group] : This visit was provided via telehealth using real-time 2-way audio visual technology. [Medical Office: ([facility])___] : The provider was located at the medical office in [unfilled]. [Home] : The patient, [unfilled], was located at home, [unfilled], at the time of the visit. [Participant(s) identity verified] : Participant(s) identity verified. [Verbal consent obtained from patient/other participant(s)] : Verbal consent for telehealth/telephonic services obtained from patient/other participant(s) [Number can be texted] : number can be texted [OK  to leave message] : OK  to leave message [Psychiatric Inpatient] : Psychiatric Inpatient [[facility] Provider/Facility] : [facility] Provider/Facility [Patient] : Patient [Prior Medical Records] : Prior Medical Records [FreeTextEntry4] : 10:35 [FreeTextEntry3] : [email or web address] [FreeTextEntry5] : English [FreeTextEntry6] : Cassy [FreeTextEntry7] : she/her [FreeTextEntry2] : IPP admission 10/5/24-10/31/24 due to psychosis, schizoaffective disorder. [FreeTextEntry1] : "I want to work on communication and trying to feel safe in the outside world especially".

## 2025-01-22 NOTE — PHYSICAL EXAM
[Well groomed] : well groomed [Cooperative] : cooperative [Euthymic] : euthymic [Clear] : clear [Linear/Goal Directed] : linear/goal directed [None] : none [None Reported] : none reported [Average] : average [WNL] : within normal limits [Full] : full [3 - Mildly ill] : 3 - Mildly ill  (clearly established symptoms with minimal, if any, distress or difficulty in social and occupational function) [2 - Much improved] : 2 - Much improved  (notably better with significant reduction of symptoms; increase in the level of functioning but some symptoms remain)

## 2025-01-22 NOTE — HISTORY OF PRESENT ILLNESS
[FreeTextEntry1] : Today, pt presents for completion of intake evaluation with psychiatrist. States she has been "good" since being discharged, however that she has not started work which she cites as major stressor. Notes when she is at work, the lights there bother her, triggering her to believe she is on a special mission to help the  keep the country safe. Notes that she still has these thoughts at times, though currently she states she is able to understand this is not the case, but when she is walking on the streets or riding in a car, for example, that she'll feel like people are trying to direct her in one direction or another and she struggles with who she should listen and pay attention to. Denies AVH, denies other paranoid thoughts. Reports she is sleeping and eating well, denies low mood or other depressive, anxious or manic symptoms at this time.   Per pt's , Mason, [phone], reports Cassy continues to not be herself, stating "her mind and her body are not present", reports difficulty concentrating while having a conversation with her, however denies she is a harm to herself or others. Notes she is completing ADLs and caring for herself. Sleeping and eating well. Reports work to be stressor for her, but denies other acute stressors/trigger. Is not aware why pt decided to stop taking medication.   Per initial intake appointment: Patient is a 44-year-old  female of American/Vietnamese descent, , domiciled with her spouse of 5 years and 3.5-year-old son in [place]. Upon discharge from [facility] unit, she has been staying with her parents in [place], along with her son, temporarily as she reports that her house is being remodeled due to having mold. She notes that her spouse is staying in a hotel in [place] as he works as a contractor on [place]. Patient was admitted to [facility] on 10/5/24-10/31-24 due to a psychotic episode, in which she stole a car to reportedly look for signs from God and for a guardian Steffen "I feel connected to God and guardian [place]". Patient reportedly stopped taking her medication from 8/13-10/4/24 "it brings me down, the lightening system in the school bothered me". She reports that her spouse face timed her and met her where she was at the time "he told me to pull over at the ". Patient denies A/V hallucinations. However, she admits to having paranoia "of worldly things that may happen". She reports "sometimes feel like I'm helping the  defeat worldly events, worried [facility] is a target, a lot going on with the migrants, a lot of things concern me". Patient reports having previous IPP admissions at [facility]: [place] in 2011"I was considered missing" and in 2020 an ED visit at [facility] due to paranoia, psychosis "they released me". PMH: Chiari Malformation (blood clotting disorder), patient reports having 3 miscarriages on 9/2019,1/2020 and 4/2014. Patient reports previous outpatient treatment providers as: Mamta Whitaker NP (5 years), virtual sessions and therapist: Tonya Gonzalez (4 months July-October). Current medication: Benztropine 1mg at bedtime, Fluphenazine 2.5mg, and 5mg at bedtime. She reports history of being on Latuda for 10 years. Patient denies history of suicide attempts or self-injurious behavior. She denies history of drug or alcohol use, vaping or tobacco use. She denies history of trauma. No legal issues reported "the  did not press charges it was his car". Patient reports family history of depression-paternal uncle, details unknown. She denies family history of substance abuse or suicide attempts. PHQ9 (score 2, minimal depression, negative) and ANDRE (score o, mild anxiety, somewhat difficult) scales completed with patient. Patient denies suicidal/homicidal ideation, plan or intent. Patient offered IOP services as she states that she is considering taking some time off from work and returning February 2025. Patient also informed that she could join an IOP group once a week for additional support, on a day she is not receiving any other services, and she was receptive to considering this option. She identifies treatment goals as ""I want to work on communication and trying to feel safe in the outside world especially". She notes being a Christianity and prays daily. She notes adherence with medication regimen, since discharge from IPP, with good appetite and sleep hygiene. Patient's mother escorted patient to intake this morning and stood in the waiting area, until intake was complete. [FreeTextEntry2] : Session began at 9:17am. Session ended at 10:25am. Patient declined [facility] referral.  All consents have been signed by patient including HIPPA release form for her Spouse: Phi Edwards [phone] and mother: Marlee Xie [phone]. Patient is a 44-year-old  female of American/Uz[place] descent, , domiciled with her spouse of 5 years and 3.5-year-old son in [place]. Upon discharge from IPP unit, she has been staying with her parents in [place], along with her son, temporarily as she reports that her house is being remodeled due to having mold. She notes that her spouse is staying in a hotel in [place] as he works as a contractor on [place]. Patient was admitted to [facility] on 10/5/24-10/31-24 due to a psychotic episode, in which she stole a car to reportedly look for signs from God and for a guardian Steffen "I feel connected to God and guardian [place]". Patient reportedly stopped taking her medication from 8/13-10/4/24 "it brings me down, the lightening system in the school bothered me". She reports that her spouse face timed her and met her where she was at the time "he told me to pull over at the ". Patient denies A/V hallucinations. However, she admits to having paranoia "of worldly things that may happen". She reports "sometimes feel like I'm helping the  defeat worldly events, worried [facility] is a target, a lot going on with the migrants, a lot of things concern me". Patient reports having previous IPP admissions at [facility]: [place] in 2011"I was considered missing" and in 2020 an ED visit at [facility] due to paranoia, psychosis "they released me". PMH: Chiari Malformation (blood clotting disorder), patient reports having 3 miscarriages on 9/2019,1/2020 and 4/2014. Patient reports previous outpatient treatment providers as: Mamta Whitaker NP (5 years), virtual sessions and therapist: Tonya Gonzalez (4 months July-October). Current medication: Benztropine 1mg at bedtime, Fluphenazine 2.5mg, and 5mg at bedtime. She reports history of being on Latuda for 10 years. Patient denies history of suicide attempts or self-injurious behavior. She denies history of drug or alcohol use, vaping or tobacco use. She denies history of trauma. No legal issues reported "the  did not press charges it was his car". Patient reports family history of depression-paternal uncle, details unknown. She denies family history of substance abuse or suicide attempts. PHQ9 (score 2, minimal depression, negative) and ANDRE (score o, mild anxiety, somewhat difficult) scales completed with patient. Patient denies suicidal/homicidal ideation, plan or intent. Patient offered IOP services as she states that she is considering taking some time off from work and returning February 2025. Patient also informed that she could join an IOP group once a week for additional support, on a day she is not receiving any other services, and she was receptive to considering this option. She identifies treatment goals as ""I want to work on communication and trying to feel safe in the outside world especially". She notes being a Yazidism and prays daily. She notes adherence with medication regimen, since discharge from [facility], with good appetite and sleep hygiene. Patient's mother escorted patient to intake this morning and stood in the waiting area, until intake was complete.

## 2025-01-22 NOTE — RISK ASSESSMENT
[Clinical Records] : Clinical Records [Clinical Interview] : Clinical Interview [No] : No [Psychotic disorder] : psychotic disorder [Psychosis] : psychosis [History of Impulsivity] : history of impulsivity [Recent inpatient discharge] : recent inpatient discharge [Triggering events leading to humiliation, shame, and/or despair] : triggering events leading to humiliation, shame, and/or despair (e.g. loss of relationship, financial or health status) (real or anticipated) [Identifies reasons for living] : identifies reasons for living [Supportive social network of family or friends] : supportive social network of family or friends [Sabianism beliefs] : Restorationism beliefs [Cultural, spiritual and/or moral attitudes against suicide] : cultural, spiritual and/or moral attitudes against suicide [Positive therapeutic relationships] : positive therapeutic relationships [Responsibility to children, family, or others] : responsibility to children, family, or others [Engaged in work or school] : engaged in work or school [None in the patient's lifetime] : None in the patient's lifetime [None Known] : none known [Feeling of being under threat and being unable to control threat] : feeling of being under threat and being unable to control threat [Affective dysregulation] : affective dysregulation [Residential stability] : residential stability [Relationship stability] : relationship stability [Employment stability] : employment stability [Engagement in treatment] : engagement in treatment [Yes] : yes [de-identified] : Patient denies access to firearms.

## 2025-01-22 NOTE — FAMILY HISTORY
[FreeTextEntry1] : Family Composition: Patient is  to her spouse of 5 years, and she has a 3.5-year-old son. Family History and Background: Patient was born in [place], raised by her parents, along with her older brother and younger sister. Family Relationship: Patient reports having a "good" relationship with her family. Pertinent Family Medical, MH and Substance Use History including Adult Child of Alcoholic and child of Substance abuse status; history of cancer and heart disease:  Patient denies family history of suicide attempts or substance abuse. She reports her paternal uncle suffered from depression; details unknown.  Mother: HTN. Father: HTN.

## 2025-01-22 NOTE — PLAN
[Admit to Program     (Add Program Admission information to a new column in the Admit/Discharge Flowsheet)] : Admit to program [FreeTextEntry5] : -discontinue prolixin -decrease seroquel XR from 300mg to 250mg po qd due to sedation -therapy referral -f/u in 2 weeks -currently on leave of absence from work (teaching) [FreeTextEntry4] : - increase prolixin from 2.5mg qam and 5mg qPM to 5mg BID to address psychosis  - continue cogentin - f/u 1 month with provider - continue therapy with Ilyssa ([phone] -- HIPPA release signed)

## 2025-01-22 NOTE — REASON FOR VISIT
[Number can be texted] : number can be texted [OK  to leave message] : OK  to leave message [Psychiatric Inpatient] : Psychiatric Inpatient [[facility] Provider/Facility] : [facility] Provider/Facility [Patient] : Patient [Prior Medical Records] : Prior Medical Records [Collateral - Name/Contact Info/Relationship:___] : Collateral: [unfilled] [FreeTextEntry3] : [email or web address] [FreeTextEntry5] : English [FreeTextEntry6] : Cassy [FreeTextEntry7] : she/her [FreeTextEntry2] : IPP admission 10/5/24-10/31/24 due to psychosis, schizoaffective disorder. [FreeTextEntry1] : Medication management

## 2025-01-22 NOTE — SOCIAL HISTORY
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 Developmental History: Milestones met within normal limits. Social Supports: Spouse, parents, siblings and friends. Meaningful Activities: Skiing, softball, volleyball, flag football-6 years ago, dancing, not actively engaging in these activities at present time, Race/Ethnicity: /Kiswahili American Sexual Identity: Heterosexual,  to her spouse since 2019. Spirituality/Catholic: Baptist.    Spiritual Assessment  What is your elayne or belief? Baptist. Do you consider yourself spiritual or Judaism? Uatsdin but not practicing but I do pray. Is there something you believe in that gives meaning to your life? God. Is it important in your life? Yes. What influence does it have on how you take care of yourself? Being a good citizen, supporting others. How have your beliefs influenced your behavior during this illness? Helpful, hopeful, brings me moses to help others, that's my selfcare. What role do your beliefs play in regaining your health? Very much so, always put God first. Are you part of a spiritual or Judaism community? No. Is this of support to you and how? N/A Is there a person or group of people you really love or who are really important to you? , parents, siblings, son all supportive. We have been discussing your belief and supports. What else gives you internal support? Praying, happiness, doing good deeds. What are your sources of hope, strength, comfort and peace? My family, my mom is Anabaptism and reminds me to pray, I went to [employer]. What do you hold on to during difficult times? God, guardian kevin, nixon, Geraldine mother of God, I have a Geraldine medal my mom gave me.

## 2025-01-22 NOTE — PSYCHOSOCIAL ASSESSMENT
[None known] : None known [Other: _____] : [unfilled] [Competitive and integrated employment] : Competitive and integrated employment [35 hours or more] : 35 hours or more [Earned income] : earned income [Financially stable] : financially stable [None] : none [Private residence (home, apartment, rooming house, hotel, motel, supported housing, supported Single Room Occupancy (SRO),] : Private residence (home, apartment, rooming house, hotel, motel, supported housing, supported Single Room Occupancy (SRO), permanent housing programs, transient housing programs, and shelter plus care housing) [Client's spouse or domestic partner] : client's spouse or domestic partner [Yes] : yes [N/A] : n/a [Spouse/Partner] : spouse/partner [Mother] : mother [Good] : good [Lives with Family Member] : lives with family member [No] : Patient has personal representation (legal guardian, representative payee, conservatorship)? No [FreeTextEntry2] : N/A [had nightmares about the event(s) or thought about the event(s) when you did not want] : did not have nightmares and/or unwanted thoughts about the events [tried hard not to think about the event(s) or went out of your way to avoid situations that reminded you of the event] : did not need to avoid thinking about events, did not need to avoid situations that might remind patient of events [has been constantly on guard, watchful, or easily startled] : has not been constantly on guard, watchful, or easily startled [felt numb or detached from people, activities, or your surrounding] : has not felt numb or detached from people, activities, or surroundings [felt guilty or unable to stop blaming yourself or others for the event(s) or any problems the event(s) may have caused] : has not felt guilty or unable to stop blaming self or others for event(s), or any problems the event(s) may have caused [FreeTextEntry6] : Patient reports that she is currently staying with her parents in [place] along with her son, while her home is being remodeled "we found out we have mold". [FreeTextEntry7] : Spouse: Phi Edwards  [phone] [FreeTextEntry8] : Mother: Marlee DePinto [phone] [FreeTextEntry1] : Patient reports she is staying with her parents temporarily, while her home in [place] is being renovated "we have mold".

## 2025-02-04 NOTE — RISK ASSESSMENT
[Clinical Records] : Clinical Records [Clinical Interview] : Clinical Interview [No] : No [Psychotic disorder] : psychotic disorder [Psychosis] : psychosis [History of Impulsivity] : history of impulsivity [Recent inpatient discharge] : recent inpatient discharge [Triggering events leading to humiliation, shame, and/or despair] : triggering events leading to humiliation, shame, and/or despair (e.g. loss of relationship, financial or health status) (real or anticipated) [Identifies reasons for living] : identifies reasons for living [Supportive social network of family or friends] : supportive social network of family or friends [Worship beliefs] : Sikh beliefs [Cultural, spiritual and/or moral attitudes against suicide] : cultural, spiritual and/or moral attitudes against suicide [Positive therapeutic relationships] : positive therapeutic relationships [Responsibility to children, family, or others] : responsibility to children, family, or others [Engaged in work or school] : engaged in work or school [None in the patient's lifetime] : None in the patient's lifetime [None Known] : none known [Feeling of being under threat and being unable to control threat] : feeling of being under threat and being unable to control threat [Affective dysregulation] : affective dysregulation [Residential stability] : residential stability [Relationship stability] : relationship stability [Employment stability] : employment stability [Engagement in treatment] : engagement in treatment [Yes] : yes [de-identified] : Patient denies access to firearms. [No, patient denies ideation or behavior] : No, patient denies ideation or behavior [FreeTextEntry8] :  Pt is at low acute risk for self harm given no current SI, no past SI/SA or other self harming behavior, has strong family and social support, has housing and financial stability, has good access to care, is engaged in care, is currently medication compliant, has no current or past substance use history, has no access to guns, and is able to engage in safety planning.

## 2025-02-04 NOTE — FAMILY HISTORY
[FreeTextEntry1] : Family Composition: Patient is  to her spouse of 5 years, and she has a 3.5-year-old son. Family History and Background: Patient was born in [place], raised by her parents, along with her older brother and younger sister. Family Relationship: Patient reports having a "good" relationship with her family. Pertinent Family Medical, MH and Substance Use History including Adult Child of Alcoholic and child of Substance abuse status; history of cancer and heart disease:  Patient denies family history of suicide attempts or substance abuse. She reports her paternal uncle suffered from depression; details unknown.  Mother: HTN. Father: HTN.

## 2025-02-04 NOTE — HISTORY OF PRESENT ILLNESS
[FreeTextEntry1] : Today, pt presents for completion of intake evaluation with psychiatrist. States she has been "good" since being discharged, however that she has not started work which she cites as major stressor. Notes when she is at work, the lights there bother her, triggering her to believe she is on a special mission to help the  keep the country safe. Notes that she still has these thoughts at times, though currently she states she is able to understand this is not the case, but when she is walking on the streets or riding in a car, for example, that she'll feel like people are trying to direct her in one direction or another and she struggles with who she should listen and pay attention to. Denies AVH, denies other paranoid thoughts. Reports she is sleeping and eating well, denies low mood or other depressive, anxious or manic symptoms at this time.   Per pt's , Mason, [phone], reports Cassy continues to not be herself, stating "her mind and her body are not present", reports difficulty concentrating while having a conversation with her, however denies she is a harm to herself or others. Notes she is completing ADLs and caring for herself. Sleeping and eating well. Reports work to be stressor for her, but denies other acute stressors/trigger. Is not aware why pt decided to stop taking medication.   Per initial intake appointment: Patient is a 44-year-old  female of American/French descent, , domiciled with her spouse of 5 years and 3.5-year-old son in [place]. Upon discharge from [facility] unit, she has been staying with her parents in [place], along with her son, temporarily as she reports that her house is being remodeled due to having mold. She notes that her spouse is staying in a hotel in [place] as he works as a contractor on [place]. Patient was admitted to [facility] on 10/5/24-10/31-24 due to a psychotic episode, in which she stole a car to reportedly look for signs from God and for a guardian Steffen "I feel connected to God and guardian [place]". Patient reportedly stopped taking her medication from 8/13-10/4/24 "it brings me down, the lightening system in the school bothered me". She reports that her spouse face timed her and met her where she was at the time "he told me to pull over at the ". Patient denies A/V hallucinations. However, she admits to having paranoia "of worldly things that may happen". She reports "sometimes feel like I'm helping the  defeat worldly events, worried [facility] is a target, a lot going on with the migrants, a lot of things concern me". Patient reports having previous IPP admissions at [facility]: [place] in 2011"I was considered missing" and in 2020 an ED visit at [facility] due to paranoia, psychosis "they released me". PMH: Chiari Malformation (blood clotting disorder), patient reports having 3 miscarriages on 9/2019,1/2020 and 4/2014. Patient reports previous outpatient treatment providers as: Mamta Whitaker NP (5 years), virtual sessions and therapist: Tonya Gonzalez (4 months July-October). Current medication: Benztropine 1mg at bedtime, Fluphenazine 2.5mg, and 5mg at bedtime. She reports history of being on Latuda for 10 years. Patient denies history of suicide attempts or self-injurious behavior. She denies history of drug or alcohol use, vaping or tobacco use. She denies history of trauma. No legal issues reported "the  did not press charges it was his car". Patient reports family history of depression-paternal uncle, details unknown. She denies family history of substance abuse or suicide attempts. PHQ9 (score 2, minimal depression, negative) and ANDRE (score o, mild anxiety, somewhat difficult) scales completed with patient. Patient denies suicidal/homicidal ideation, plan or intent. Patient offered IOP services as she states that she is considering taking some time off from work and returning February 2025. Patient also informed that she could join an IOP group once a week for additional support, on a day she is not receiving any other services, and she was receptive to considering this option. She identifies treatment goals as ""I want to work on communication and trying to feel safe in the outside world especially". 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 She notes adherence with medication regimen, since discharge from IPP, with good appetite and sleep hygiene. Patient's mother escorted patient to intake this morning and stood in the waiting area, until intake was complete. [FreeTextEntry2] : Session began at 9:17am. Session ended at 10:25am. Patient declined [facility] referral.  All consents have been signed by patient including HIPPA release form for her Spouse: Phi Edwards [phone] and mother: Marlee Xie [phone]. Patient is a 44-year-old  female of American/Japanese descent, , domiciled with her spouse of 5 years and 3.5-year-old son in [place]. Upon discharge from IPP unit, she has been staying with her parents in [place], along with her son, temporarily as she reports that her house is being remodeled due to having mold. She notes that her spouse is staying in a hotel in [place] as he works as a contractor on [place]. Patient was admitted to [facility] on 10/5/24-10/31-24 due to a psychotic episode, in which she stole a car to reportedly look for signs from God and for a guardian Steffen "I feel connected to God and guardian [place]". Patient reportedly stopped taking her medication from 8/13-10/4/24 "it brings me down, the lightening system in the school bothered me". She reports that her spouse face timed her and met her where she was at the time "he told me to pull over at the ". Patient denies A/V hallucinations. However, she admits to having paranoia "of worldly things that may happen". She reports "sometimes feel like I'm helping the  defeat worldly events, worried [facility] is a target, a lot going on with the migrants, a lot of things concern me". Patient reports having previous IPP admissions at [facility]: [place] in 2011"I was considered missing" and in 2020 an ED visit at [facility] due to paranoia, psychosis "they released me". PMH: Chiari Malformation (blood clotting disorder), patient reports having 3 miscarriages on 9/2019,1/2020 and 4/2014. Patient reports previous outpatient treatment providers as: Mamta Whitaker NP (5 years), virtual sessions and therapist: Tonya Gonzalez (4 months July-October). Current medication: Benztropine 1mg at bedtime, Fluphenazine 2.5mg, and 5mg at bedtime. She reports history of being on Latuda for 10 years. Patient denies history of suicide attempts or self-injurious behavior. She denies history of drug or alcohol use, vaping or tobacco use. She denies history of trauma. No legal issues reported "the  did not press charges it was his car". Patient reports family history of depression-paternal uncle, details unknown. She denies family history of substance abuse or suicide attempts. PHQ9 (score 2, minimal depression, negative) and ANDRE (score o, mild anxiety, somewhat difficult) scales completed with patient. Patient denies suicidal/homicidal ideation, plan or intent. Patient offered IOP services as she states that she is considering taking some time off from work and returning February 2025. Patient also informed that she could join an IOP group once a week for additional support, on a day she is not receiving any other services, and she was receptive to considering this option. She identifies treatment goals as ""I want to work on communication and trying to feel safe in the outside world especially". She notes being a Yazdanism and prays daily. She notes adherence with medication regimen, since discharge from [facility], with good appetite and sleep hygiene. Patient's mother escorted patient to intake this morning and stood in the waiting area, until intake was complete.

## 2025-02-04 NOTE — SOCIAL HISTORY
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 Developmental History: Milestones met within normal limits. Social Supports: Spouse, parents, siblings and friends. Meaningful Activities: Skiing, softball, volleyball, flag football-6 years ago, dancing, not actively engaging in these activities at present time, Race/Ethnicity: /Amharic American Sexual Identity: Heterosexual,  to her spouse since 2019. Spirituality/Presybeterian: Confucianism.    Spiritual Assessment  What is your elayne or belief? Confucianism. Do you consider yourself spiritual or Protestant? Episcopal but not practicing but I do pray. Is there something you believe in that gives meaning to your life? God. Is it important in your life? Yes. What influence does it have on how you take care of yourself? Being a good citizen, supporting others. How have your beliefs influenced your behavior during this illness? Helpful, hopeful, brings me moses to help others, that's my selfcare. What role do your beliefs play in regaining your health? Very much so, always put God first. Are you part of a spiritual or Protestant community? No. Is this of support to you and how? N/A Is there a person or group of people you really love or who are really important to you? , parents, siblings, son all supportive. We have been discussing your belief and supports. What else gives you internal support? Praying, happiness, doing good deeds. What are your sources of hope, strength, comfort and peace? My family, my mom is Synagogue and reminds me to pray, I went to [employer] school. What do you hold on to during difficult times? God, guardian kevin, nixon, Geraldine mother of God, I have a Geraldine medal my mom gave me.

## 2025-02-04 NOTE — REASON FOR VISIT
[Number can be texted] : number can be texted [OK  to leave message] : OK  to leave message [FreeTextEntry3] : [email or web address] [FreeTextEntry5] : English [FreeTextEntry6] : Cassy [FreeTextEntry7] : she/her [Psychiatric Inpatient] : Psychiatric Inpatient [[facility] Provider/Facility] : [facility] Provider/Facility [Patient] : Patient [Prior Medical Records] : Prior Medical Records [Collateral - Name/Contact Info/Relationship:___] : Collateral: [unfilled] [FreeTextEntry2] : IPP admission 10/5/24-10/31/24 due to psychosis, schizoaffective disorder. [FreeTextEntry1] : Medication management

## 2025-02-04 NOTE — PLAN
[FreeTextEntry5] : -continue seroquel ER 250mg po qd due to sedation -therapy referral -f/u in 4 weeks -currently on leave of absence from work (teaching) [Admit to Program     (Add Program Admission information to a new column in the Admit/Discharge Flowsheet)] : Admit to program [FreeTextEntry4] : - increase prolixin from 2.5mg qam and 5mg qPM to 5mg BID to address psychosis  - continue cogentin - f/u 1 month with provider - continue therapy with Ilyssa ([phone] -- HIPPA release signed)

## 2025-02-04 NOTE — PSYCHOSOCIAL ASSESSMENT
[None known] : None known [FreeTextEntry2] : N/A [Other: _____] : [unfilled] [Competitive and integrated employment] : Competitive and integrated employment [35 hours or more] : 35 hours or more [Earned income] : earned income [Financially stable] : financially stable [None] : none [Private residence (home, apartment, rooming house, hotel, motel, supported housing, supported Single Room Occupancy (SRO),] : Private residence (home, apartment, rooming house, hotel, motel, supported housing, supported Single Room Occupancy (SRO), permanent housing programs, transient housing programs, and shelter plus care housing) [Client's spouse or domestic partner] : client's spouse or domestic partner [Yes] : yes [N/A] : n/a [Spouse/Partner] : spouse/partner [Mother] : mother [Good] : good [Lives with Family Member] : lives with family member [No] : Patient has personal representation (legal guardian, representative payee, conservatorship)? No [FreeTextEntry6] : Patient reports that she is currently staying with her parents in [place] along with her son, while her home is being remodeled "we found out we have mold". [FreeTextEntry7] : Spouse: Phi Edwards  [phone] [FreeTextEntry8] : Mother: Marlee DePinto [phone] [FreeTextEntry1] : Patient reports she is staying with her parents temporarily, while her home in [place] is being renovated "we have mold".

## 2025-02-04 NOTE — PHYSICAL EXAM
[Well groomed] : well groomed [Cooperative] : cooperative [Euthymic] : euthymic [Full] : full [Clear] : clear [Linear/Goal Directed] : linear/goal directed [None] : none [None Reported] : none reported [Average] : average [WNL] : within normal limits [3 - Mildly ill] : 3 - Mildly ill  (clearly established symptoms with minimal, if any, distress or difficulty in social and occupational function) [4 - No change] : 4 - No change  (symptoms remain essentially unchanged)

## 2025-03-04 NOTE — PHYSICAL EXAM
[Well groomed] : well groomed [Cooperative] : cooperative [Euthymic] : euthymic [Full] : full [Clear] : clear [Linear/Goal Directed] : linear/goal directed [None] : none [None Reported] : none reported [Average] : average [WNL] : within normal limits [2 - Borderline mentally ill] : 2 - Borderline mentally ill (subtle or suspected pathology) [4 - No change] : 4 - No change  (symptoms remain essentially unchanged)

## 2025-03-04 NOTE — PSYCHOSOCIAL ASSESSMENT
[None known] : None known [FreeTextEntry2] : N/A [Other: _____] : [unfilled] [Competitive and integrated employment] : Competitive and integrated employment [35 hours or more] : 35 hours or more [Earned income] : earned income [Financially stable] : financially stable [None] : none [Private residence (home, apartment, rooming house, hotel, motel, supported housing, supported Single Room Occupancy (SRO),] : Private residence (home, apartment, rooming house, hotel, motel, supported housing, supported Single Room Occupancy (SRO), permanent housing programs, transient housing programs, and shelter plus care housing) [Client's spouse or domestic partner] : client's spouse or domestic partner [Yes] : yes [N/A] : n/a [Spouse/Partner] : spouse/partner [Mother] : mother [Good] : good [Lives with Family Member] : lives with family member [No] : Patient has personal representation (legal guardian, representative payee, conservatorship)? No [FreeTextEntry6] : Patient reports that she is currently staying with her parents in [place] along with her son, while her home is being remodeled "we found out we have mold". [FreeTextEntry7] : Spouse: Phi Edwards  [phone] [FreeTextEntry8] : Mother: Marlee DePinto [phone] [FreeTextEntry1] : Patient reports she is staying with her parents temporarily, while her home in [place] is being renovated "we have mold".

## 2025-03-04 NOTE — PLAN
[FreeTextEntry4] : Maintain functional and mood stability. [FreeTextEntry5] : -continue seroquel ER 250mg po qd due to sedation -f/u in 4 weeks -returned to work as , no longer on leave of absence

## 2025-03-04 NOTE — REASON FOR VISIT
[Patient preference] : as per patient preference [Telehealth (audio & video) - Individual/Group] : This visit was provided via telehealth using real-time 2-way audio visual technology. [Medical Office: ([facility])___] : The provider was located at the medical office in [unfilled]. [Home] : The patient, [unfilled], was located at home, [unfilled], at the time of the visit. [Verbal consent obtained from patient/other participant(s)] : Verbal consent for telehealth/telephonic services obtained from patient/other participant(s) [FreeTextEntry4] : 10:40 [Number can be texted] : number can be texted [OK  to leave message] : OK  to leave message [FreeTextEntry3] : [email or web address] [FreeTextEntry5] : English [FreeTextEntry6] : Cassy [FreeTextEntry7] : she/her [Psychiatric Inpatient] : Psychiatric Inpatient [[facility] Provider/Facility] : [facility] Provider/Facility [Patient] : Patient [Prior Medical Records] : Prior Medical Records [Collateral - Name/Contact Info/Relationship:___] : Collateral: [unfilled] [FreeTextEntry2] : IPP admission 10/5/24-10/31/24 due to psychosis, schizoaffective disorder. [FreeTextEntry1] : Medication management

## 2025-03-04 NOTE — SOCIAL HISTORY
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 Developmental History: Milestones met within normal limits. Social Supports: Spouse, parents, siblings and friends. Meaningful Activities: Skiing, softball, volleyball, flag football-6 years ago, dancing, not actively engaging in these activities at present time, Race/Ethnicity: /Malay American Sexual Identity: Heterosexual,  to her spouse since 2019. Spirituality/Amish: Jew.    Spiritual Assessment  What is your elayne or belief? Jew. Do you consider yourself spiritual or Yazdanism? Yazidi but not practicing but I do pray. Is there something you believe in that gives meaning to your life? God. Is it important in your life? Yes. What influence does it have on how you take care of yourself? Being a good citizen, supporting others. How have your beliefs influenced your behavior during this illness? Helpful, hopeful, brings me moses to help others, that's my selfcare. What role do your beliefs play in regaining your health? Very much so, always put God first. Are you part of a spiritual or Yazdanism community? No. Is this of support to you and how? N/A Is there a person or group of people you really love or who are really important to you? , parents, siblings, son all supportive. We have been discussing your belief and supports. What else gives you internal support? Praying, happiness, doing good deeds. What are your sources of hope, strength, comfort and peace? My family, my mom is Anabaptist and reminds me to pray, I went to Orthodoxy school. What do you hold on to during difficult times? God, guardian kevin, nixon, Geraldine mother of God, I have a Geraldine medal my mom gave me.

## 2025-03-04 NOTE — HISTORY OF PRESENT ILLNESS
[FreeTextEntry1] : Today, pt presents for completion of intake evaluation with psychiatrist. States she has been "good" since being discharged, however that she has not started work which she cites as major stressor. Notes when she is at work, the lights there bother her, triggering her to believe she is on a special mission to help the  keep the country safe. Notes that she still has these thoughts at times, though currently she states she is able to understand this is not the case, but when she is walking on the streets or riding in a car, for example, that she'll feel like people are trying to direct her in one direction or another and she struggles with who she should listen and pay attention to. Denies AVH, denies other paranoid thoughts. Reports she is sleeping and eating well, denies low mood or other depressive, anxious or manic symptoms at this time.   Per pt's , Mason, [phone], reports Cassy continues to not be herself, stating "her mind and her body are not present", reports difficulty concentrating while having a conversation with her, however denies she is a harm to herself or others. Notes she is completing ADLs and caring for herself. Sleeping and eating well. Reports work to be stressor for her, but denies other acute stressors/trigger. Is not aware why pt decided to stop taking medication.   Per initial intake appointment: Patient is a 44-year-old  female of American/Turkish descent, , domiciled with her spouse of 5 years and 3.5-year-old son in [place]. Upon discharge from [facility] unit, she has been staying with her parents in [place], along with her son, temporarily as she reports that her house is being remodeled due to having mold. She notes that her spouse is staying in a hotel in [place] as he works as a contractor on [place]. Patient was admitted to [facility] on 10/5/24-10/31-24 due to a psychotic episode, in which she stole a car to reportedly look for signs from God and for a guardian Steffen "I feel connected to God and guardian [place]". Patient reportedly stopped taking her medication from 8/13-10/4/24 "it brings me down, the lightening system in the school bothered me". She reports that her spouse face timed her and met her where she was at the time "he told me to pull over at the ". Patient denies A/V hallucinations. However, she admits to having paranoia "of worldly things that may happen". She reports "sometimes feel like I'm helping the  defeat worldly events, worried [facility] is a target, a lot going on with the migrants, a lot of things concern me". Patient reports having previous IPP admissions at [facility] [place] in 2011"I was considered missing" and in 2020 an ED visit at [facility] due to paranoia, psychosis "they released me". PMH: Chiari Malformation (blood clotting disorder), patient reports having 3 miscarriages on 9/2019,1/2020 and 4/2014. Patient reports previous outpatient treatment providers as: Mamta Whitaker NP (5 years), virtual sessions and therapist: Tonya Gonzalez (4 months July-October). Current medication: Benztropine 1mg at bedtime, Fluphenazine 2.5mg, and 5mg at bedtime. She reports history of being on Latuda for 10 years. Patient denies history of suicide attempts or self-injurious behavior. She denies history of drug or alcohol use, vaping or tobacco use. She denies history of trauma. No legal issues reported "the  did not press charges it was his car". Patient reports family history of depression-paternal uncle, details unknown. She denies family history of substance abuse or suicide attempts. PHQ9 (score 2, minimal depression, negative) and ANDRE (score o, mild anxiety, somewhat difficult) scales completed with patient. Patient denies suicidal/homicidal ideation, plan or intent. Patient offered IOP services as she states that she is considering taking some time off from work and returning February 2025. Patient also informed that she could join an IOP group once a week for additional support, on a day she is not receiving any other services, and she was receptive to considering this option. She identifies treatment goals as ""I want to work on communication and trying to feel safe in the outside world especially". She notes being a Sabianism and prays daily. She notes adherence with medication regimen, since discharge from IPP, with good appetite and sleep hygiene. Patient's mother escorted patient to intake this morning and stood in the waiting area, until intake was complete. [FreeTextEntry2] : Session began at 9:17am. Session ended at 10:25am. Patient declined [facility] referral.  All consents have been signed by patient including HIPPA release form for her Spouse: Phi Edwards [phone] and mother: Marlee Xie [phone]. Patient is a 44-year-old  female of American/Khmer descent, , domiciled with her spouse of 5 years and 3.5-year-old son in [place]. Upon discharge from IPP unit, she has been staying with her parents in [place], along with her son, temporarily as she reports that her house is being remodeled due to having mold. She notes that her spouse is staying in a hotel in [place] as he works as a contractor on [place]. Patient was admitted to [facility] on 10/5/24-10/31-24 due to a psychotic episode, in which she stole a car to reportedly look for signs from God and for a guardian Steffen "I feel connected to God and guardian [place]". Patient reportedly stopped taking her medication from 8/13-10/4/24 "it brings me down, the lightening system in the school bothered me". She reports that her spouse face timed her and met her where she was at the time "he told me to pull over at the ". Patient denies A/V hallucinations. However, she admits to having paranoia "of worldly things that may happen". She reports "sometimes feel like I'm helping the  defeat worldly events, worried [facility] is a target, a lot going on with the migrants, a lot of things concern me". Patient reports having previous IPP admissions at [facility]: [place] in 2011"I was considered missing" and in 2020 an ED visit at [facility] due to paranoia, psychosis "they released me". PMH: Chiari Malformation (blood clotting disorder), patient reports having 3 miscarriages on 9/2019,1/2020 and 4/2014. Patient reports previous outpatient treatment providers as: Mamta Whitaker NP (5 years), virtual sessions and therapist: Tonya Gonzalez (4 months July-October). Current medication: Benztropine 1mg at bedtime, Fluphenazine 2.5mg, and 5mg at bedtime. She reports history of being on Latuda for 10 years. Patient denies history of suicide attempts or self-injurious behavior. She denies history of drug or alcohol use, vaping or tobacco use. She denies history of trauma. No legal issues reported "the  did not press charges it was his car". Patient reports family history of depression-paternal uncle, details unknown. She denies family history of substance abuse or suicide attempts. PHQ9 (score 2, minimal depression, negative) and ANDRE (score o, mild anxiety, somewhat difficult) scales completed with patient. Patient denies suicidal/homicidal ideation, plan or intent. Patient offered IOP services as she states that she is considering taking some time off from work and returning February 2025. Patient also informed that she could join an IOP group once a week for additional support, on a day she is not receiving any other services, and she was receptive to considering this option. She identifies treatment goals as ""I want to work on communication and trying to feel safe in the outside world especially". She notes being a Voodoo and prays daily. She notes adherence with medication regimen, since discharge from [facility], with good appetite and sleep hygiene. Patient's mother escorted patient to intake this morning and stood in the waiting area, until intake was complete.

## 2025-03-04 NOTE — FAMILY HISTORY
[FreeTextEntry1] : Family Composition: Patient is  to her spouse of 5 years, and she has a 3.5-year-old son. Family History and Background: Patient was born in [place], raised by her parents, along with her older brother and younger sister. Family Relationship: Patient reports having a "good" relationship with her family. Pertinent Family Medical, MH and Substance Use History including Adult Child of Alcoholic and child of Substance abuse status; history of cancer and heart disease:  Patient denies family history of suicide attempts or substance abuse. She reports her paternal uncle suffered from depression; details unknown.  Mother: HTN. Father: HTN.  
No

## 2025-03-04 NOTE — RISK ASSESSMENT
[Clinical Records] : Clinical Records [Clinical Interview] : Clinical Interview [No] : No [Psychotic disorder] : psychotic disorder [Psychosis] : psychosis [History of Impulsivity] : history of impulsivity [Recent inpatient discharge] : recent inpatient discharge [Triggering events leading to humiliation, shame, and/or despair] : triggering events leading to humiliation, shame, and/or despair (e.g. loss of relationship, financial or health status) (real or anticipated) [Identifies reasons for living] : identifies reasons for living [Supportive social network of family or friends] : supportive social network of family or friends [Yarsani beliefs] : Sikh beliefs [Cultural, spiritual and/or moral attitudes against suicide] : cultural, spiritual and/or moral attitudes against suicide [Positive therapeutic relationships] : positive therapeutic relationships [Responsibility to children, family, or others] : responsibility to children, family, or others [Engaged in work or school] : engaged in work or school [None in the patient's lifetime] : None in the patient's lifetime [None Known] : none known [Feeling of being under threat and being unable to control threat] : feeling of being under threat and being unable to control threat [Affective dysregulation] : affective dysregulation [Residential stability] : residential stability [Relationship stability] : relationship stability [Employment stability] : employment stability [Engagement in treatment] : engagement in treatment [Yes] : yes [de-identified] : Patient denies access to firearms. [No, patient denies ideation or behavior] : No, patient denies ideation or behavior [FreeTextEntry8] :  Pt is at low acute risk for self harm given no current SI, no past SI/SA or other self harming behavior, has strong family and social support, has housing and financial stability, has good access to care, is engaged in care, is currently medication compliant, has no current or past substance use history, has no access to guns, and is able to engage in safety planning.

## 2025-04-01 NOTE — RISK ASSESSMENT
[Clinical Records] : Clinical Records [Clinical Interview] : Clinical Interview [No] : No [Psychotic disorder] : psychotic disorder [Psychosis] : psychosis [History of Impulsivity] : history of impulsivity [Recent inpatient discharge] : recent inpatient discharge [Triggering events leading to humiliation, shame, and/or despair] : triggering events leading to humiliation, shame, and/or despair (e.g. loss of relationship, financial or health status) (real or anticipated) [Identifies reasons for living] : identifies reasons for living [Supportive social network of family or friends] : supportive social network of family or friends [Worship beliefs] : Shinto beliefs [Cultural, spiritual and/or moral attitudes against suicide] : cultural, spiritual and/or moral attitudes against suicide [Positive therapeutic relationships] : positive therapeutic relationships [Responsibility to children, family, or others] : responsibility to children, family, or others [Engaged in work or school] : engaged in work or school [None in the patient's lifetime] : None in the patient's lifetime [None Known] : none known [Feeling of being under threat and being unable to control threat] : feeling of being under threat and being unable to control threat [Affective dysregulation] : affective dysregulation [Residential stability] : residential stability [Relationship stability] : relationship stability [Employment stability] : employment stability [Engagement in treatment] : engagement in treatment [Yes] : yes [de-identified] : Patient denies access to firearms. [No, patient denies ideation or behavior] : No, patient denies ideation or behavior [FreeTextEntry8] :  Pt is at low acute risk for self harm given no current SI, no past SI/SA or other self harming behavior, has strong family and social support, has housing and financial stability, has good access to care, is engaged in care, is currently medication compliant, has no current or past substance use history, has no access to guns, and is able to engage in safety planning.

## 2025-04-01 NOTE — PHYSICAL EXAM
[Well groomed] : well groomed [Cooperative] : cooperative [Euthymic] : euthymic [Full] : full [Clear] : clear [Linear/Goal Directed] : linear/goal directed [None] : none [None Reported] : none reported [Average] : average [WNL] : within normal limits [1 - Normal] : 1 - Normal  (not at all ill, symptoms of disorder not present past seven days) [4 - No change] : 4 - No change  (symptoms remain essentially unchanged)

## 2025-04-01 NOTE — HISTORY OF PRESENT ILLNESS
[FreeTextEntry1] : Today, pt presents for completion of intake evaluation with psychiatrist. States she has been "good" since being discharged, however that she has not started work which she cites as major stressor. Notes when she is at work, the lights there bother her, triggering her to believe she is on a special mission to help the  keep the country safe. Notes that she still has these thoughts at times, though currently she states she is able to understand this is not the case, but when she is walking on the streets or riding in a car, for example, that she'll feel like people are trying to direct her in one direction or another and she struggles with who she should listen and pay attention to. Denies AVH, denies other paranoid thoughts. Reports she is sleeping and eating well, denies low mood or other depressive, anxious or manic symptoms at this time.   Per pt's , Mason, [phone], reports Cassy continues to not be herself, stating "her mind and her body are not present", reports difficulty concentrating while having a conversation with her, however denies she is a harm to herself or others. Notes she is completing ADLs and caring for herself. Sleeping and eating well. Reports work to be stressor for her, but denies other acute stressors/trigger. Is not aware why pt decided to stop taking medication.   Per initial intake appointment: Patient is a 44-year-old  female of American/Chinese descent, , domiciled with her spouse of 5 years and 3.5-year-old son in [place]. Upon discharge from [facility] unit, she has been staying with her parents in [place], along with her son, temporarily as she reports that her house is being remodeled due to having mold. She notes that her spouse is staying in a hotel in [place] as he works as a contractor on [place]. Patient was admitted to [facility] on 10/5/24-10/31-24 due to a psychotic episode, in which she stole a car to reportedly look for signs from God and for a guardian Steffen "I feel connected to God and guardian [place]". Patient reportedly stopped taking her medication from 8/13-10/4/24 "it brings me down, the lightening system in the school bothered me". She reports that her spouse face timed her and met her where she was at the time "he told me to pull over at the ". Patient denies A/V hallucinations. However, she admits to having paranoia "of worldly things that may happen". She reports "sometimes feel like I'm helping the  defeat worldly events, worried [facility] is a target, a lot going on with the migrants, a lot of things concern me". Patient reports having previous IPP admissions at [facility]: [place] in 2011"I was considered missing" and in 2020 an ED visit at [facility] due to paranoia, psychosis "they released me". PMH: Chiari Malformation (blood clotting disorder), patient reports having 3 miscarriages on 9/2019,1/2020 and 4/2014. Patient reports previous outpatient treatment providers as: Mamta Whitaker NP (5 years), virtual sessions and therapist: Tonya Gonzalez (4 months July-October). Current medication: Benztropine 1mg at bedtime, Fluphenazine 2.5mg, and 5mg at bedtime. She reports history of being on Latuda for 10 years. Patient denies history of suicide attempts or self-injurious behavior. She denies history of drug or alcohol use, vaping or tobacco use. She denies history of trauma. No legal issues reported "the  did not press charges it was his car". Patient reports family history of depression-paternal uncle, details unknown. She denies family history of substance abuse or suicide attempts. PHQ9 (score 2, minimal depression, negative) and ANDRE (score o, mild anxiety, somewhat difficult) scales completed with patient. Patient denies suicidal/homicidal ideation, plan or intent. Patient offered IOP services as she states that she is considering taking some time off from work and returning February 2025. Patient also informed that she could join an IOP group once a week for additional support, on a day she is not receiving any other services, and she was receptive to considering this option. She identifies treatment goals as ""I want to work on communication and trying to feel safe in the outside world especially". She notes being a Pentecostalism and prays daily. She notes adherence with medication regimen, since discharge from IPP, with good appetite and sleep hygiene. Patient's mother escorted patient to intake this morning and stood in the waiting area, until intake was complete. [FreeTextEntry2] : Session began at 9:17am. Session ended at 10:25am. Patient declined [facility] referral.  All consents have been signed by patient including HIPPA release form for her Spouse: Phi Edwards [phone] and mother: Marlee Xie [phone]. Patient is a 44-year-old  female of American/Chinese descent, , domiciled with her spouse of 5 years and 3.5-year-old son in [place]. Upon discharge from IPP unit, she has been staying with her parents in [place], along with her son, temporarily as she reports that her house is being remodeled due to having mold. She notes that her spouse is staying in a hotel in [place] as he works as a contractor on [place]. Patient was admitted to [facility] on 10/5/24-10/31-24 due to a psychotic episode, in which she stole a car to reportedly look for signs from God and for a guardian Steffen "I feel connected to God and guardian [place]". Patient reportedly stopped taking her medication from 8/13-10/4/24 "it brings me down, the lightening system in the school bothered me". She reports that her spouse face timed her and met her where she was at the time "he told me to pull over at the ". Patient denies A/V hallucinations. However, she admits to having paranoia "of worldly things that may happen". She reports "sometimes feel like I'm helping the  defeat worldly events, worried [facility] is a target, a lot going on with the migrants, a lot of things concern me". Patient reports having previous IPP admissions at [facility]: [place] in 2011"I was considered missing" and in 2020 an ED visit at [facility] due to paranoia, psychosis "they released me". PMH: Chiari Malformation (blood clotting disorder), patient reports having 3 miscarriages on 9/2019,1/2020 and 4/2014. Patient reports previous outpatient treatment providers as: Mamta Whitaker NP (5 years), virtual sessions and therapist: Tonya Gonzalez (4 months July-October). Current medication: Benztropine 1mg at bedtime, Fluphenazine 2.5mg, and 5mg at bedtime. She reports history of being on Latuda for 10 years. Patient denies history of suicide attempts or self-injurious behavior. She denies history of drug or alcohol use, vaping or tobacco use. She denies history of trauma. No legal issues reported "the  did not press charges it was his car". Patient reports family history of depression-paternal uncle, details unknown. She denies family history of substance abuse or suicide attempts. PHQ9 (score 2, minimal depression, negative) and ANDRE (score o, mild anxiety, somewhat difficult) scales completed with patient. Patient denies suicidal/homicidal ideation, plan or intent. Patient offered IOP services as she states that she is considering taking some time off from work and returning February 2025. Patient also informed that she could join an IOP group once a week for additional support, on a day she is not receiving any other services, and she was receptive to considering this option. She identifies treatment goals as ""I want to work on communication and trying to feel safe in the outside world especially". She notes being a Cheondoism and prays daily. She notes adherence with medication regimen, since discharge from [facility], with good appetite and sleep hygiene. Patient's mother escorted patient to intake this morning and stood in the waiting area, until intake was complete.

## 2025-04-01 NOTE — FAMILY HISTORY
[FreeTextEntry1] : Family Composition: Patient is  to her spouse of 5 years, and she has a 3.5-year-old son. Family History and Background: Patient was born in [place], raised by her parents, along with her older brother and younger sister. Family Relationship: Patient reports having a "good" relationship with her family. Pertinent Family Medical, MH and Substance Use History including Adult Child of Alcoholic and child of Substance abuse status; history of cancer and heart disease:  Patient denies family history of suicide attempts or substance abuse. She reports her paternal uncle suffered from depression; details unknown.  Mother: HTN. Father: HTN.

## 2025-04-01 NOTE — DISCUSSION/SUMMARY
[Low acute suicide risk] : Low acute suicide risk [No] : No [FreeTextEntry1] : Pt is at low acute risk for self harm given no current SI, no past SI/SA or other self harming behavior, has strong family and social support, has housing and financial stability, has good access to care, is engaged in care, is currently medication compliant, has no current or past substance use history, has no access to guns, and is able to engage in safety planning.  [FreeTextEntry3] : Patient denies suicidal ideation, plan or intent and identifies her protective factors as her son,spouse,parents and siblings. [Constipation: Grade 1 - Occasional or intermittent symptoms; occasional use of stool softeners, laxatives, dietary modification, or enema] : Constipation: Grade 1 - Occasional or intermittent symptoms; occasional use of stool softeners, laxatives, dietary modification, or enema [Urinary Incontinence: Grade 1 - Occasional (e.g., with coughing, sneezing, etc.), pads not indicated] : Urinary Incontinence: Grade 1 - Occasional (e.g., with coughing, sneezing, etc.), pads not indicated

## 2025-04-01 NOTE — REASON FOR VISIT
[Patient preference] : as per patient preference [Telehealth (audio & video) - Individual/Group] : This visit was provided via telehealth using real-time 2-way audio visual technology. [Medical Office: ([facility])___] : The provider was located at the medical office in [unfilled]. [Home] : The patient, [unfilled], was located at home, [unfilled], at the time of the visit. [Verbal consent obtained from patient/other participant(s)] : Verbal consent for telehealth/telephonic services obtained from patient/other participant(s) [FreeTextEntry4] : 10:30 [Number can be texted] : number can be texted [OK  to leave message] : OK  to leave message [FreeTextEntry3] : [email or web address] [FreeTextEntry5] : English [FreeTextEntry6] : Cassy [FreeTextEntry7] : she/her [Psychiatric Inpatient] : Psychiatric Inpatient [[facility] Provider/Facility] : [facility] Provider[facility]Facility [Patient] : Patient [Prior Medical Records] : Prior Medical Records [Collateral - Name/Contact Info/Relationship:___] : Collateral: [unfilled] [FreeTextEntry2] : IPP admission 10/5/24-10/31/24 due to psychosis, schizoaffective disorder. [FreeTextEntry1] : Medication management

## 2025-04-01 NOTE — SOCIAL HISTORY
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 Developmental History: Milestones met within normal limits. Social Supports: Spouse, parents, siblings and friends. Meaningful Activities: Skiing, softball, volleyball, flag football-6 years ago, dancing, not actively engaging in these activities at present time, Race/Ethnicity: /Vietnamese American Sexual Identity: Heterosexual,  to her spouse since 2019. Spirituality/Episcopalian: Buddhist.    Spiritual Assessment  What is your elayne or belief? Buddhist. Do you consider yourself spiritual or Jew? Baptism but not practicing but I do pray. Is there something you believe in that gives meaning to your life? God. Is it important in your life? Yes. What influence does it have on how you take care of yourself? Being a good citizen, supporting others. How have your beliefs influenced your behavior during this illness? Helpful, hopeful, brings me moses to help others, that's my selfcare. What role do your beliefs play in regaining your health? Very much so, always put God first. Are you part of a spiritual or Jew community? No. Is this of support to you and how? N/A Is there a person or group of people you really love or who are really important to you? , parents, siblings, son all supportive. We have been discussing your belief and supports. What else gives you internal support? Praying, happiness, doing good deeds. What are your sources of hope, strength, comfort and peace? My family, my mom is Latter day and reminds me to pray, I went to Shinto school. What do you hold on to during difficult times? God, guardian kevin, nixon, Geraldine mother of God, I have a Geraldine medal my mom gave me.

## 2025-04-01 NOTE — PSYCHOSOCIAL ASSESSMENT
[None known] : None known [FreeTextEntry2] : N/A [Other: _____] : [unfilled] [Competitive and integrated employment] : Competitive and integrated employment [35 hours or more] : 35 hours or more [Earned income] : earned income [Financially stable] : financially stable [None] : none [Private residence (home, apartment, rooming house, hotel, motel, supported housing, supported Single Room Occupancy (SRO),] : Private residence (home, apartment, rooming house, hotel, motel, supported housing, supported Single Room Occupancy (SRO), permanent housing programs, transient housing programs, and shelter plus care housing) [Client's spouse or domestic partner] : client's spouse or domestic partner [Yes] : yes [N/A] : n/a [Spouse/Partner] : spouse/partner [Mother] : mother [Good] : good [Lives with Family Member] : lives with family member [No] : Patient has personal representation (legal guardian, representative payee, conservatorship)? No [FreeTextEntry6] : Patient reports that she is currently staying with her parents in [place] along with her son, while her home is being remodeled "we found out we have mold". [FreeTextEntry7] : Spouse: Phi Edwards  [phone] [FreeTextEntry8] : Mother: Marlee DePinto [phone] [FreeTextEntry1] : Patient reports she is staying with her parents temporarily, while her home in [place] is being renovated "we have mold".

## 2025-04-01 NOTE — PLAN
[FreeTextEntry4] : Maintain functional and mood stability. [FreeTextEntry5] : -continue seroquel ER 250mg po qd due to sedation -f/u in 4 weeks

## 2025-04-29 NOTE — HISTORY OF PRESENT ILLNESS
[FreeTextEntry1] : Today, pt presents for completion of intake evaluation with psychiatrist. States she has been "good" since being discharged, however that she has not started work which she cites as major stressor. Notes when she is at work, the lights there bother her, triggering her to believe she is on a special mission to help the  keep the country safe. Notes that she still has these thoughts at times, though currently she states she is able to understand this is not the case, but when she is walking on the streets or riding in a car, for example, that she'll feel like people are trying to direct her in one direction or another and she struggles with who she should listen and pay attention to. Denies AVH, denies other paranoid thoughts. Reports she is sleeping and eating well, denies low mood or other depressive, anxious or manic symptoms at this time.   Per pt's , Mason, [phone], reports Cassy continues to not be herself, stating "her mind and her body are not present", reports difficulty concentrating while having a conversation with her, however denies she is a harm to herself or others. Notes she is completing ADLs and caring for herself. Sleeping and eating well. Reports work to be stressor for her, but denies other acute stressors/trigger. Is not aware why pt decided to stop taking medication.   Per initial intake appointment: Patient is a 44-year-old  female of American/Slov[place] descent, , domiciled with her spouse of 5 years and 3.5-year-old son in [place]. Upon discharge from [facility] unit, she has been staying with her parents in [place], along with her son, temporarily as she reports that her house is being remodeled due to having mold. She notes that her spouse is staying in a hotel in [place] as he works as a contractor on [place]. Patient was admitted to [facility] on 10/5/24-10/31-24 due to a psychotic episode, in which she stole a car to reportedly look for signs from God and for a guardian Steffen "I feel connected to God and guardian [place]". Patient reportedly stopped taking her medication from 8/13-10/4/24 "it brings me down, the lightening system in the school bothered me". She reports that her spouse face timed her and met her where she was at the time "he told me to pull over at the ". Patient denies A/V hallucinations. However, she admits to having paranoia "of worldly things that may happen". She reports "sometimes feel like I'm helping the  defeat worldly events, worried [facility] is a target, a lot going on with the migrants, a lot of things concern me". Patient reports having previous IPP admissions at [facility]: [place] in 2011"I was considered missing" and in 2020 an ED visit at [facility] due to paranoia, psychosis "they released me". PMH: Chiari Malformation (blood clotting disorder), patient reports having 3 miscarriages on 9/2019,1/2020 and 4/2014. Patient reports previous outpatient treatment providers as: Mamta Whitaker NP (5 years), virtual sessions and therapist: Tonya Gonzalez (4 months July-October). Current medication: Benztropine 1mg at bedtime, Fluphenazine 2.5mg, and 5mg at bedtime. She reports history of being on Latuda for 10 years. Patient denies history of suicide attempts or self-injurious behavior. She denies history of drug or alcohol use, vaping or tobacco use. She denies history of trauma. No legal issues reported "the  did not press charges it was his car". Patient reports family history of depression-paternal uncle, details unknown. She denies family history of substance abuse or suicide attempts. PHQ9 (score 2, minimal depression, negative) and ANDRE (score o, mild anxiety, somewhat difficult) scales completed with patient. Patient denies suicidal/homicidal ideation, plan or intent. Patient offered IOP services as she states that she is considering taking some time off from work and returning February 2025. Patient also informed that she could join an IOP group once a week for additional support, on a day she is not receiving any other services, and she was receptive to considering this option. She identifies treatment goals as ""I want to work on communication and trying to feel safe in the outside world especially". She notes being a Zoroastrian and prays daily. She notes adherence with medication regimen, since discharge from IPP, with good appetite and sleep hygiene. Patient's mother escorted patient to intake this morning and stood in the waiting area, until intake was complete. [FreeTextEntry2] : Session began at 9:17am. Session ended at 10:25am. Patient declined [facility] referral.  All consents have been signed by patient including HIPPA release form for her Spouse: Phi Edwards [phone] and mother: Marlee Xie [phone]. Patient is a 44-year-old  female of American/Yakut descent, , domiciled with her spouse of 5 years and 3.5-year-old son in [place]. Upon discharge from IPP unit, she has been staying with her parents in [place], along with her son, temporarily as she reports that her house is being remodeled due to having mold. She notes that her spouse is staying in a hotel in [place] as he works as a contractor on [place]. Patient was admitted to [facility] on 10/5/24-10/31-24 due to a psychotic episode, in which she stole a car to reportedly look for signs from God and for a guardian Steffen "I feel connected to God and guardian [place]". Patient reportedly stopped taking her medication from 8/13-10/4/24 "it brings me down, the lightening system in the school bothered me". She reports that her spouse face timed her and met her where she was at the time "he told me to pull over at the ". Patient denies A/V hallucinations. However, she admits to having paranoia "of worldly things that may happen". She reports "sometimes feel like I'm helping the  defeat worldly events, worried [facility] is a target, a lot going on with the migrants, a lot of things concern me". Patient reports having previous IPP admissions at [facility] [place] in 2011"I was considered missing" and in 2020 an ED visit at [facility] due to paranoia, psychosis "they released me". PMH: Chiari Malformation (blood clotting disorder), patient reports having 3 miscarriages on 9/2019,1/2020 and 4/2014. Patient reports previous outpatient treatment providers as: Mamta Whitaker NP (5 years), virtual sessions and therapist: Tonya Gonzalez (4 months July-October). Current medication: Benztropine 1mg at bedtime, Fluphenazine 2.5mg, and 5mg at bedtime. She reports history of being on Latuda for 10 years. Patient denies history of suicide attempts or self-injurious behavior. She denies history of drug or alcohol use, vaping or tobacco use. She denies history of trauma. No legal issues reported "the  did not press charges it was his car". Patient reports family history of depression-paternal uncle, details unknown. She denies family history of substance abuse or suicide attempts. PHQ9 (score 2, minimal depression, negative) and ANDRE (score o, mild anxiety, somewhat difficult) scales completed with patient. Patient denies suicidal/homicidal ideation, plan or intent. Patient offered IOP services as she states that she is considering taking some time off from work and returning February 2025. Patient also informed that she could join an IOP group once a week for additional support, on a day she is not receiving any other services, and she was receptive to considering this option. She identifies treatment goals as ""I want to work on communication and trying to feel safe in the outside world especially". She notes being a Christian and prays daily. She notes adherence with medication regimen, since discharge from [facility], with good appetite and sleep hygiene. Patient's mother escorted patient to intake this morning and stood in the waiting area, until intake was complete.

## 2025-04-29 NOTE — FAMILY HISTORY
[FreeTextEntry1] : Family Composition: Patient is  to her spouse of 5 years, and she has a 3.5-year-old son. Family History and Background: Patient was born in [place], raised by her parents, along with her older brother and younger sister. Family Relationship: Patient reports having a "good" relationship with her family. Pertinent Family Medical, MH and Substance Use History including Adult Child of Alcoholic and child of Substance abuse status; history of cancer and heart disease:  Patient denies family history of suicide attempts or substance abuse. She reports her paternal uncle suffered from depression; details unknown.  Mother: HTN. Father: HTN.

## 2025-04-29 NOTE — SOCIAL HISTORY
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 Developmental History: Milestones met within normal limits. Social Supports: Spouse, parents, siblings and friends. Meaningful Activities: Skiing, softball, volleyball, flag football-6 years ago, dancing, not actively engaging in these activities at present time, Race/Ethnicity: /Mohawk American Sexual Identity: Heterosexual,  to her spouse since 2019. Spirituality/Synagogue: Temple.    Spiritual Assessment  What is your elayne or belief? Temple. Do you consider yourself spiritual or Samaritan? Pentecostalism but not practicing but I do pray. Is there something you believe in that gives meaning to your life? God. Is it important in your life? Yes. What influence does it have on how you take care of yourself? Being a good citizen, supporting others. How have your beliefs influenced your behavior during this illness? Helpful, hopeful, brings me moses to help others, that's my selfcare. What role do your beliefs play in regaining your health? Very much so, always put God first. Are you part of a spiritual or Samaritan community? No. Is this of support to you and how? N/A Is there a person or group of people you really love or who are really important to you? , parents, siblings, son all supportive. We have been discussing your belief and supports. What else gives you internal support? Praying, happiness, doing good deeds. What are your sources of hope, strength, comfort and peace? My family, my mom is Baptism and reminds me to pray, I went to Anabaptist school. What do you hold on to during difficult times? God, guardian kevin, nixon, Geraldine mother of God, I have a Geraldine medal my mom gave me.

## 2025-04-29 NOTE — RISK ASSESSMENT
[Clinical Records] : Clinical Records [Clinical Interview] : Clinical Interview [No] : No [Psychotic disorder] : psychotic disorder [Psychosis] : psychosis [History of Impulsivity] : history of impulsivity [Recent inpatient discharge] : recent inpatient discharge [Triggering events leading to humiliation, shame, and/or despair] : triggering events leading to humiliation, shame, and/or despair (e.g. loss of relationship, financial or health status) (real or anticipated) [Identifies reasons for living] : identifies reasons for living [Supportive social network of family or friends] : supportive social network of family or friends [Hoahaoism beliefs] : Buddhist beliefs [Cultural, spiritual and/or moral attitudes against suicide] : cultural, spiritual and/or moral attitudes against suicide [Positive therapeutic relationships] : positive therapeutic relationships [Responsibility to children, family, or others] : responsibility to children, family, or others [Engaged in work or school] : engaged in work or school [None in the patient's lifetime] : None in the patient's lifetime [None Known] : none known [Feeling of being under threat and being unable to control threat] : feeling of being under threat and being unable to control threat [Affective dysregulation] : affective dysregulation [Residential stability] : residential stability [Relationship stability] : relationship stability [Employment stability] : employment stability [Engagement in treatment] : engagement in treatment [Yes] : yes [No, patient denies ideation or behavior] : No, patient denies ideation or behavior [de-identified] : Patient denies access to firearms. [FreeTextEntry8] :  Pt is at low acute risk for self harm given no current SI, no past SI/SA or other self harming behavior, has strong family and social support, has housing and financial stability, has good access to care, is engaged in care, is currently medication compliant, has no current or past substance use history, has no access to guns, and is able to engage in safety planning.

## 2025-04-29 NOTE — PSYCHOSOCIAL ASSESSMENT
[None known] : None known [Other: _____] : [unfilled] [Competitive and integrated employment] : Competitive and integrated employment [35 hours or more] : 35 hours or more [Earned income] : earned income [Financially stable] : financially stable [None] : none [Private residence (home, apartment, rooming house, hotel, motel, supported housing, supported Single Room Occupancy (SRO),] : Private residence (home, apartment, rooming house, hotel, motel, supported housing, supported Single Room Occupancy (SRO), permanent housing programs, transient housing programs, and shelter plus care housing) [Client's spouse or domestic partner] : client's spouse or domestic partner [Yes] : yes [N/A] : n/a [Spouse/Partner] : spouse/partner [Mother] : mother [Good] : good [Lives with Family Member] : lives with family member [No] : Patient has personal representation (legal guardian, representative payee, conservatorship)? No [FreeTextEntry2] : N/A [FreeTextEntry6] : Patient reports that she is currently staying with her parents in [place] along with her son, while her home is being remodeled "we found out we have mold". [FreeTextEntry7] : Spouse: Phi Edwards  [phone] [FreeTextEntry8] : Mother: Marlee DePinto [phone] [FreeTextEntry1] : Patient reports she is staying with her parents temporarily, while her home in [place] is being renovated "we have mold".

## 2025-04-29 NOTE — REASON FOR VISIT
[Patient preference] : as per patient preference [Telehealth (audio & video) - Individual/Group] : This visit was provided via telehealth using real-time 2-way audio visual technology. [Medical Office: ([facility])___] : The provider was located at the medical office in [unfilled]. [Home] : The patient, [unfilled], was located at home, [unfilled], at the time of the visit. [Verbal consent obtained from patient/other participant(s)] : Verbal consent for telehealth/telephonic services obtained from patient/other participant(s) [Number can be texted] : number can be texted [OK  to leave message] : OK  to leave message [Psychiatric Inpatient] : Psychiatric Inpatient [[facility] Provider/Facility] : [facility] Provider/Facility [Patient] : Patient [Prior Medical Records] : Prior Medical Records [Collateral - Name/Contact Info/Relationship:___] : Collateral: [unfilled] [FreeTextEntry4] : 1:15 [FreeTextEntry3] : [email or web address] [FreeTextEntry5] : English [FreeTextEntry6] : Cassy [FreeTextEntry7] : she/her [FreeTextEntry2] : IPP admission 10/5/24-10/31/24 due to psychosis, schizoaffective disorder. [FreeTextEntry1] : Medication management

## 2025-04-29 NOTE — PLAN
[FreeTextEntry4] : Maintain functional and mood stability. [FreeTextEntry5] : -decrease seroquel ER from 250mg to 200mg po qd due to sedation -f/u in 2 weeks given dose adjustment

## 2025-05-21 NOTE — SOCIAL HISTORY
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 Developmental History: Milestones met within normal limits. Social Supports: Spouse, parents, siblings and friends. Meaningful Activities: Skiing, softball, volleyball, flag football-6 years ago, dancing, not actively engaging in these activities at present time, Race/Ethnicity: /Japanese American Sexual Identity: Heterosexual,  to her spouse since 2019. Spirituality/Sikhism: Protestant.    Spiritual Assessment  What is your elayne or belief? Protestant. Do you consider yourself spiritual or Nondenominational? Islam but not practicing but I do pray. Is there something you believe in that gives meaning to your life? God. Is it important in your life? Yes. What influence does it have on how you take care of yourself? Being a good citizen, supporting others. How have your beliefs influenced your behavior during this illness? Helpful, hopeful, brings me moses to help others, that's my selfcare. What role do your beliefs play in regaining your health? Very much so, always put God first. Are you part of a spiritual or Nondenominational community? No. Is this of support to you and how? N/A Is there a person or group of people you really love or who are really important to you? , parents, siblings, son all supportive. We have been discussing your belief and supports. What else gives you internal support? Praying, happiness, doing good deeds. What are your sources of hope, strength, comfort and peace? My family, my mom is Sabianism and reminds me to pray, I went to Adventist school. What do you hold on to during difficult times? God, guardian kevin, nixon, Geraldine mother of God, I have a Geraldine medal my mom gave me.

## 2025-05-21 NOTE — PSYCHOSOCIAL ASSESSMENT
[None known] : None known [Other: _____] : [unfilled] [Competitive and integrated employment] : Competitive and integrated employment [35 hours or more] : 35 hours or more [Earned income] : earned income [Financially stable] : financially stable [None] : none [Private residence (home, apartment, rooming house, hotel, motel, supported housing, supported Single Room Occupancy (SRO),] : Private residence (home, apartment, rooming house, hotel, motel, supported housing, supported Single Room Occupancy (SRO), permanent housing programs, transient housing programs, and shelter plus care housing) [Client's spouse or domestic partner] : client's spouse or domestic partner [Yes] : yes [N/A] : n/a [Spouse/Partner] : spouse/partner [Mother] : mother [Good] : good [Lives with Family Member] : lives with family member [No] : Patient has personal representation (legal guardian, representative payee, conservatorship)? No [FreeTextEntry2] : N/A [FreeTextEntry6] : Patient reports that she is currently staying with her parents in [place] along with her son, while her home is being remodeled "we found out we have mold". [FreeTextEntry7] : Spouse: Phi Edwards  [phone] [FreeTextEntry8] : Mother: Marlee DePinto [phone] [FreeTextEntry1] : Patient reports she is staying with her parents temporarily, while her home in [place] is being renovated "we have mold".

## 2025-05-21 NOTE — RISK ASSESSMENT
[Clinical Records] : Clinical Records [Clinical Interview] : Clinical Interview [No] : No [Psychotic disorder] : psychotic disorder [Psychosis] : psychosis [History of Impulsivity] : history of impulsivity [Recent inpatient discharge] : recent inpatient discharge [Triggering events leading to humiliation, shame, and/or despair] : triggering events leading to humiliation, shame, and/or despair (e.g. loss of relationship, financial or health status) (real or anticipated) [Identifies reasons for living] : identifies reasons for living [Supportive social network of family or friends] : supportive social network of family or friends [Worship beliefs] : Sikhism beliefs [Cultural, spiritual and/or moral attitudes against suicide] : cultural, spiritual and/or moral attitudes against suicide [Positive therapeutic relationships] : positive therapeutic relationships [Responsibility to children, family, or others] : responsibility to children, family, or others [Engaged in work or school] : engaged in work or school [None in the patient's lifetime] : None in the patient's lifetime [None Known] : none known [Feeling of being under threat and being unable to control threat] : feeling of being under threat and being unable to control threat [Affective dysregulation] : affective dysregulation [Residential stability] : residential stability [Relationship stability] : relationship stability [Employment stability] : employment stability [Engagement in treatment] : engagement in treatment [Yes] : yes [No, patient denies ideation or behavior] : No, patient denies ideation or behavior [de-identified] : Patient denies access to firearms. [FreeTextEntry8] :  Pt is at low acute risk for self harm given no current SI, no past SI/SA or other self harming behavior, has strong family and social support, has housing and financial stability, has good access to care, is engaged in care, is currently medication compliant, has no current or past substance use history, has no access to guns, and is able to engage in safety planning.

## 2025-05-21 NOTE — PLAN
[FreeTextEntry4] : Maintain functional and mood stability. [FreeTextEntry5] : -f/u with provider upon discharge from hospital visit

## 2025-05-21 NOTE — FAMILY HISTORY
[FreeTextEntry1] : Family Composition: Patient is  to her spouse of 5 years, and she has a 3.5-year-old son. Family History and Background: Patient was born in [place], raised by her parents, along with her older brother and younger sister. Family Relationship: Patient reports having a "good" relationship with her family. Pertinent Family Medical, MH and Substance Use History including Adult Child of Alcoholic and child of Substance abuse status; history of cancer and heart disease:  Patient denies family history of suicide attempts or substance abuse. She reports her paternal uncle suffered from depression; details unknown.  Mother: HTN. Father: HTN.

## 2025-05-21 NOTE — REASON FOR VISIT
[Patient preference] : as per patient preference [Telehealth (audio & video) - Individual/Group] : This visit was provided via telehealth using real-time 2-way audio visual technology. [Medical Office: ([facility])___] : The provider was located at the medical office in [unfilled]. [Home] : The patient, [unfilled], was located at home, [unfilled], at the time of the visit. [Verbal consent obtained from patient/other participant(s)] : Verbal consent for telehealth/telephonic services obtained from patient/other participant(s) [Number can be texted] : number can be texted [OK  to leave message] : OK  to leave message [Psychiatric Inpatient] : Psychiatric Inpatient [[facility]] [facility] [Patient] : Patient [Prior Medical Records] : Prior Medical Records [Collateral - Name/Contact Info/Relationship:___] : Collateral: [unfilled] [FreeTextEntry4] : 11:00 [FreeTextEntry3] : [email or web address] [FreeTextEntry5] : English [FreeTextEntry6] : Casys [FreeTextEntry7] : she/her [FreeTextEntry2] : IPP admission 10/5/24-10/31/24 due to psychosis, schizoaffective disorder. [FreeTextEntry1] : Medication management

## 2025-05-21 NOTE — HISTORY OF PRESENT ILLNESS
[FreeTextEntry1] : Today, pt presents for completion of intake evaluation with psychiatrist. States she has been "good" since being discharged, however that she has not started work which she cites as major stressor. Notes when she is at work, the lights there bother her, triggering her to believe she is on a special mission to help the  keep the country safe. Notes that she still has these thoughts at times, though currently she states she is able to understand this is not the case, but when she is walking on the streets or riding in a car, for example, that she'll feel like people are trying to direct her in one direction or another and she struggles with who she should listen and pay attention to. Denies AVH, denies other paranoid thoughts. Reports she is sleeping and eating well, denies low mood or other depressive, anxious or manic symptoms at this time.   Per pt's , Mason, [phone], reports Cassy continues to not be herself, stating "her mind and her body are not present", reports difficulty concentrating while having a conversation with her, however denies she is a harm to herself or others. Notes she is completing ADLs and caring for herself. Sleeping and eating well. Reports work to be stressor for her, but denies other acute stressors/trigger. Is not aware why pt decided to stop taking medication.   Per initial intake appointment: Patient is a 44-year-old  female of American/Faroese descent, , domiciled with her spouse of 5 years and 3.5-year-old son in [place]. Upon discharge from [facility] unit, she has been staying with her parents in [place], along with her son, temporarily as she reports that her house is being remodeled due to having mold. She notes that her spouse is staying in a hotel in [place] as he works as a contractor on [place]. Patient was admitted to [facility] on 10/5/24-10/31-24 due to a psychotic episode, in which she stole a car to reportedly look for signs from God and for a guardian Steffen "I feel connected to God and guardian Orinda". Patient reportedly stopped taking her medication from 8/13-10/4/24 "it brings me down, the lightening system in the school bothered me". She reports that her spouse face timed her and met her where she was at the time "he told me to pull over at the ". Patient denies A/V hallucinations. However, she admits to having paranoia "of worldly things that may happen". She reports "sometimes feel like I'm helping the  defeat worldly events, worried [facility] is a target, a lot going on with the migrants, a lot of things concern me". Patient reports having previous IPP admissions at [facility]: [place] in 2011"I was considered missing" and in 2020 an ED visit at [facility] due to paranoia, psychosis "they released me". PMH: Chiari Malformation (blood clotting disorder), patient reports having 3 miscarriages on 9/2019,1/2020 and 4/2014. Patient reports previous outpatient treatment providers as: Mamta Whitaker NP (5 years), virtual sessions and therapist: Tonya Gonzalez (4 months July-October). Current medication: Benztropine 1mg at bedtime, Fluphenazine 2.5mg, and 5mg at bedtime. She reports history of being on Latuda for 10 years. Patient denies history of suicide attempts or self-injurious behavior. She denies history of drug or alcohol use, vaping or tobacco use. She denies history of trauma. No legal issues reported "the  did not press charges it was his car". Patient reports family history of depression-paternal uncle, details unknown. She denies family history of substance abuse or suicide attempts. PHQ9 (score 2, minimal depression, negative) and ANDRE (score o, mild anxiety, somewhat difficult) scales completed with patient. Patient denies suicidal/homicidal ideation, plan or intent. Patient offered IOP services as she states that she is considering taking some time off from work and returning February 2025. Patient also informed that she could join an IOP group once a week for additional support, on a day she is not receiving any other services, and she was receptive to considering this option. She identifies treatment goals as ""I want to work on communication and trying to feel safe in the outside world especially". She notes being a Anabaptism and prays daily. She notes adherence with medication regimen, since discharge from IPP, with good appetite and sleep hygiene. Patient's mother escorted patient to intake this morning and stood in the waiting area, until intake was complete. [FreeTextEntry2] : Session began at 9:17am. Session ended at 10:25am. Patient declined [facility] referral.  All consents have been signed by patient including HIPPA release form for her Spouse: Phi Edwards [phone] and mother: Marlee Xie [phone]. Patient is a 44-year-old  female of American/Arabic descent, , domiciled with her spouse of 5 years and 3.5-year-old son in [place]. Upon discharge from IPP unit, she has been staying with her parents in [place], along with her son, temporarily as she reports that her house is being remodeled due to having mold. She notes that her spouse is staying in a hotel in [place] as he works as a contractor on [place]. Patient was admitted to [facility] on 10/5/24-10/31-24 due to a psychotic episode, in which she stole a car to reportedly look for signs from God and for a guardian Steffen "I feel connected to God and guardian [place]". Patient reportedly stopped taking her medication from 8/13-10/4/24 "it brings me down, the lightening system in the school bothered me". She reports that her spouse face timed her and met her where she was at the time "he told me to pull over at the ". Patient denies A/V hallucinations. However, she admits to having paranoia "of worldly things that may happen". She reports "sometimes feel like I'm helping the  defeat worldly events, worried [facility] is a target, a lot going on with the migrants, a lot of things concern me". Patient reports having previous IPP admissions at [facility]: [place] in 2011"I was considered missing" and in 2020 an ED visit at [facility] due to paranoia, psychosis "they released me". PMH: Chiari Malformation (blood clotting disorder), patient reports having 3 miscarriages on 9/2019,1/2020 and 4/2014. Patient reports previous outpatient treatment providers as: Mamta Whitaker NP (5 years), virtual sessions and therapist: Tonya Gonzalez (4 months July-October). Current medication: Benztropine 1mg at bedtime, Fluphenazine 2.5mg, and 5mg at bedtime. She reports history of being on Latuda for 10 years. Patient denies history of suicide attempts or self-injurious behavior. She denies history of drug or alcohol use, vaping or tobacco use. She denies history of trauma. No legal issues reported "the  did not press charges it was his car". Patient reports family history of depression-paternal uncle, details unknown. She denies family history of substance abuse or suicide attempts. PHQ9 (score 2, minimal depression, negative) and ANDRE (score o, mild anxiety, somewhat difficult) scales completed with patient. Patient denies suicidal/homicidal ideation, plan or intent. Patient offered IOP services as she states that she is considering taking some time off from work and returning February 2025. Patient also informed that she could join an IOP group once a week for additional support, on a day she is not receiving any other services, and she was receptive to considering this option. She identifies treatment goals as ""I want to work on communication and trying to feel safe in the outside world especially". She notes being a Taoism and prays daily. She notes adherence with medication regimen, since discharge from [facility], with good appetite and sleep hygiene. Patient's mother escorted patient to intake this morning and stood in the waiting area, until intake was complete.

## 2025-05-21 NOTE — PHYSICAL EXAM
[Well groomed] : well groomed [Clear] : clear [Average] : average [WNL] : within normal limits [Defensive] : defensive [Anxious] : anxious [Labile] : labile [Tangential] : tangential [Preoccupations/Ruminations] : preoccupations/ruminations [Reference] : reference [4 - Moderately ill] : 4 - Moderately ill  (overt symptoms causing noticeable, but modest, functional impairment or distress; symptom level may warrant medication) [6 - Much worse] : 6 - Much worse (clinically significant increase in symptoms and diminished functioning) [de-identified] : moderate

## 2025-06-13 NOTE — RISK ASSESSMENT
[Clinical Records] : Clinical Records [Clinical Interview] : Clinical Interview [No] : No [Psychotic disorder] : psychotic disorder [Psychosis] : psychosis [History of Impulsivity] : history of impulsivity [Recent inpatient discharge] : recent inpatient discharge [Triggering events leading to humiliation, shame, and/or despair] : triggering events leading to humiliation, shame, and/or despair (e.g. loss of relationship, financial or health status) (real or anticipated) [Identifies reasons for living] : identifies reasons for living [Supportive social network of family or friends] : supportive social network of family or friends [Uatsdin beliefs] : Restoration beliefs [Cultural, spiritual and/or moral attitudes against suicide] : cultural, spiritual and/or moral attitudes against suicide [Positive therapeutic relationships] : positive therapeutic relationships [Responsibility to children, family, or others] : responsibility to children, family, or others [Engaged in work or school] : engaged in work or school [None in the patient's lifetime] : None in the patient's lifetime [None Known] : none known [Feeling of being under threat and being unable to control threat] : feeling of being under threat and being unable to control threat [Affective dysregulation] : affective dysregulation [Residential stability] : residential stability [Relationship stability] : relationship stability [Employment stability] : employment stability [Engagement in treatment] : engagement in treatment [Yes] : yes [No, patient denies ideation or behavior] : No, patient denies ideation or behavior [de-identified] : Patient denies access to firearms. [FreeTextEntry8] :  Pt is at low acute risk for self harm given no current SI, no past SI/SA or other self harming behavior, has strong family and social support, has housing and financial stability, has good access to care, is engaged in care, is currently medication compliant, has no current or past substance use history, has no access to guns, and is able to engage in safety planning.

## 2025-06-13 NOTE — SOCIAL HISTORY
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 Developmental History: Milestones met within normal limits. Social Supports: Spouse, parents, siblings and friends. Meaningful Activities: Skiing, softball, volleyball, flag football-6 years ago, dancing, not actively engaging in these activities at present time, Race/Ethnicity: /[place]ian American Sexual Identity: Heterosexual,  to her spouse since 2019. Spirituality/Jain: Buddhism.    Spiritual Assessment  What is your elayne or belief? Buddhism. Do you consider yourself spiritual or Episcopal? Shinto but not practicing but I do pray. Is there something you believe in that gives meaning to your life? God. Is it important in your life? Yes. What influence does it have on how you take care of yourself? Being a good citizen, supporting others. How have your beliefs influenced your behavior during this illness? Helpful, hopeful, brings me moses to help others, that's my selfcare. What role do your beliefs play in regaining your health? Very much so, always put God first. Are you part of a spiritual or Episcopal community? No. Is this of support to you and how? N/A Is there a person or group of people you really love or who are really important to you? , parents, siblings, son all supportive. We have been discussing your belief and supports. What else gives you internal support? Praying, happiness, doing good deeds. What are your sources of hope, strength, comfort and peace? My family, my mom is Advent and reminds me to pray, I went to [employer] school. What do you hold on to during difficult times? God, guardian kevin, nixon, Geraldine mother of God, I have a Geraldine medal my mom gave me.

## 2025-06-13 NOTE — PHYSICAL EXAM
[Well groomed] : well groomed [Cooperative] : cooperative [Euthymic] : euthymic [Constricted] : constricted [Clear] : clear [Linear/Goal Directed] : linear/goal directed [Paranoid] : paranoid [None Reported] : none reported [Reference] : reference [Average] : average [WNL] : within normal limits [5 - Markedly ill] : 5 - Markedly ill  (intrusive symptoms that distinctly impair social/occupational function or cause intrusive levels of distress) [6 - Much worse] : 6 - Much worse (clinically significant increase in symptoms and diminished functioning) [de-identified] : moderate

## 2025-06-13 NOTE — FAMILY HISTORY
[FreeTextEntry1] : Family Composition: Patient is  to her spouse of 5 years, and she has a 3.5-year-old son. Family History and Background: Patient was born in [place], raised by her parents, along with her older brother and younger sister. Family Relationship: Patient reports having a "good" relationship with her family. Pertinent Family Medical, MH and Substance Use History including Adult Child of Alcoholic and child of Substance abuse status; history of cancer and heart disease:  Patient denies family history of suicide attempts or substance abuse. She reports her paternal uncle suffered from depression; details unknown.  Mother: HTN. Father: HTN.

## 2025-06-13 NOTE — HISTORY OF PRESENT ILLNESS
[FreeTextEntry1] : Today, pt presents for completion of intake evaluation with psychiatrist. States she has been "good" since being discharged, however that she has not started work which she cites as major stressor. Notes when she is at work, the lights there bother her, triggering her to believe she is on a special mission to help the  keep the country safe. Notes that she still has these thoughts at times, though currently she states she is able to understand this is not the case, but when she is walking on the streets or riding in a car, for example, that she'll feel like people are trying to direct her in one direction or another and she struggles with who she should listen and pay attention to. Denies AVH, denies other paranoid thoughts. Reports she is sleeping and eating well, denies low mood or other depressive, anxious or manic symptoms at this time.   Per pt's , Mason, [phone], reports Cassy continues to not be herself, stating "her mind and her body are not present", reports difficulty concentrating while having a conversation with her, however denies she is a harm to herself or others. Notes she is completing ADLs and caring for herself. Sleeping and eating well. Reports work to be stressor for her, but denies other acute stressors/trigger. Is not aware why pt decided to stop taking medication.   Per initial intake appointment: Patient is a 44-year-old  female of American/Czech descent, , domiciled with her spouse of 5 years and 3.5-year-old son in [place]. Upon discharge from [facility] unit, she has been staying with her parents in [place], along with her son, temporarily as she reports that her house is being remodeled due to having mold. She notes that her spouse is staying in a hotel in [place] as he works as a contractor on [place]. Patient was admitted to [facility] on 10/5/24-10/31-24 due to a psychotic episode, in which she stole a car to reportedly look for signs from God and for a guardian Steffen "I feel connected to God and guardian [place]". Patient reportedly stopped taking her medication from 8/13-10/4/24 "it brings me down, the lightening system in the school bothered me". She reports that her spouse face timed her and met her where she was at the time "he told me to pull over at the ". Patient denies A/V hallucinations. However, she admits to having paranoia "of worldly things that may happen". She reports "sometimes feel like I'm helping the  defeat worldly events, worried [facility] is a target, a lot going on with the migrants, a lot of things concern me". Patient reports having previous IPP admissions at [facility]: [place] in 2011"I was considered missing" and in 2020 an ED visit at [facility] due to paranoia, psychosis "they released me". PMH: Chiari Malformation (blood clotting disorder), patient reports having 3 miscarriages on 9/2019,1/2020 and 4/2014. Patient reports previous outpatient treatment providers as: Mamta Whitaker NP (5 years), virtual sessions and therapist: Tonya Gonzalez (4 months July-October). Current medication: Benztropine 1mg at bedtime, Fluphenazine 2.5mg, and 5mg at bedtime. She reports history of being on Latuda for 10 years. Patient denies history of suicide attempts or self-injurious behavior. She denies history of drug or alcohol use, vaping or tobacco use. She denies history of trauma. No legal issues reported "the  did not press charges it was his car". Patient reports family history of depression-paternal uncle, details unknown. She denies family history of substance abuse or suicide attempts. PHQ9 (score 2, minimal depression, negative) and ANDRE (score o, mild anxiety, somewhat difficult) scales completed with patient. Patient denies suicidal/homicidal ideation, plan or intent. Patient offered IOP services as she states that she is considering taking some time off from work and returning February 2025. Patient also informed that she could join an IOP group once a week for additional support, on a day she is not receiving any other services, and she was receptive to considering this option. She identifies treatment goals as ""I want to work on communication and trying to feel safe in the outside world especially". She notes being a Protestant and prays daily. She notes adherence with medication regimen, since discharge from IPP, with good appetite and sleep hygiene. Patient's mother escorted patient to intake this morning and stood in the waiting area, until intake was complete. [FreeTextEntry2] : Session began at 9:17am. Session ended at 10:25am. Patient declined [facility] referral.  All consents have been signed by patient including HIPPA release form for her Spouse: Phi Edwards [phone] and mother: Marlee Xie [phone]. Patient is a 44-year-old  female of American/Lithuanian descent, , domiciled with her spouse of 5 years and 3.5-year-old son in [place]. Upon discharge from IPP unit, she has been staying with her parents in [place], along with her son, temporarily as she reports that her house is being remodeled due to having mold. She notes that her spouse is staying in a hotel in [place] as he works as a contractor on [place]. Patient was admitted to [facility] on 10/5/24-10/31-24 due to a psychotic episode, in which she stole a car to reportedly look for signs from God and for a guardian Steffen "I feel connected to God and guardian [place]". Patient reportedly stopped taking her medication from 8/13-10/4/24 "it brings me down, the lightening system in the school bothered me". She reports that her spouse face timed her and met her where she was at the time "he told me to pull over at the ". Patient denies A/V hallucinations. However, she admits to having paranoia "of worldly things that may happen". She reports "sometimes feel like I'm helping the  defeat worldly events, worried [facility] is a target, a lot going on with the migrants, a lot of things concern me". Patient reports having previous IPP admissions at [facility]: [place] in 2011"I was considered missing" and in 2020 an ED visit at [facility] due to paranoia, psychosis "they released me". PMH: Chiari Malformation (blood clotting disorder), patient reports having 3 miscarriages on 9/2019,1/2020 and 4/2014. Patient reports previous outpatient treatment providers as: Mamta Whitaker NP (5 years), virtual sessions and therapist: Tonya Gonzalez (4 months July-October). Current medication: Benztropine 1mg at bedtime, Fluphenazine 2.5mg, and 5mg at bedtime. She reports history of being on Latuda for 10 years. Patient denies history of suicide attempts or self-injurious behavior. She denies history of drug or alcohol use, vaping or tobacco use. She denies history of trauma. No legal issues reported "the  did not press charges it was his car". Patient reports family history of depression-paternal uncle, details unknown. She denies family history of substance abuse or suicide attempts. PHQ9 (score 2, minimal depression, negative) and ANDRE (score o, mild anxiety, somewhat difficult) scales completed with patient. Patient denies suicidal/homicidal ideation, plan or intent. Patient offered IOP services as she states that she is considering taking some time off from work and returning February 2025. Patient also informed that she could join an IOP group once a week for additional support, on a day she is not receiving any other services, and she was receptive to considering this option. She identifies treatment goals as ""I want to work on communication and trying to feel safe in the outside world especially". She notes being a Methodist and prays daily. She notes adherence with medication regimen, since discharge from [facility], with good appetite and sleep hygiene. Patient's mother escorted patient to intake this morning and stood in the waiting area, until intake was complete.

## 2025-06-13 NOTE — PLAN
[FreeTextEntry4] : Maintain functional and mood stability. [FreeTextEntry5] : -continue seroquel 200mg 2 tabs for TTD 400mg po qd -f/u with provider in 1 week -go to ED for urgent issues -will consider zyprexa if pt does not stabilize on seroquel (failed trials on latuda and risperidone)

## 2025-06-13 NOTE — PSYCHOSOCIAL ASSESSMENT
[None known] : None known [Other: _____] : [unfilled] [Competitive and integrated employment] : Competitive and integrated employment [35 hours or more] : 35 hours or more [Earned income] : earned income [Financially stable] : financially stable [None] : none [Private residence (home, apartment, rooming house, hotel, motel, supported housing, supported Single Room Occupancy (SRO),] : Private residence (home, apartment, rooming house, hotel, motel, supported housing, supported Single Room Occupancy (SRO), permanent housing programs, transient housing programs, and shelter plus care housing) [Client's spouse or domestic partner] : client's spouse or domestic partner [Yes] : yes [N/A] : n/a [Spouse/Partner] : spouse/partner [Mother] : mother [Good] : good [Lives with Family Member] : lives with family member [No] : Patient has personal representation (legal guardian, representative payee, conservatorship)? No [FreeTextEntry2] : N/A [FreeTextEntry6] : Patient reports that she is currently staying with her parents in [place] along with her son, while her home is being remodeled "we found out we have mold". [FreeTextEntry7] : Spouse: Phi Edwards  [phone] [FreeTextEntry8] : Mother: Marlee DePinto [phone] [FreeTextEntry1] : Patient reports she is staying with her parents temporarily, while her home in [place] is being renovated "we have mold".

## 2025-06-13 NOTE — REASON FOR VISIT
[Patient preference] : as per patient preference [Telehealth (audio & video) - Individual/Group] : This visit was provided via telehealth using real-time 2-way audio visual technology. [Other Location: e.g. Home (Enter Location, City,State)___] : The provider was located at [unfilled]. [Home] : The patient, [unfilled], was located at home, [unfilled], at the time of the visit. [Verbal consent obtained from patient/other participant(s)] : Verbal consent for telehealth/telephonic services obtained from patient/other participant(s) [Number can be texted] : number can be texted [OK  to leave message] : OK  to leave message [Psychiatric Inpatient] : Psychiatric Inpatient [[facility] Provider/Facility] : [facility] Provider/Facility [Patient] : Patient [Prior Medical Records] : Prior Medical Records [Collateral - Name/Contact Info/Relationship:___] : Collateral: [unfilled] [FreeTextEntry4] : 1:30PM [FreeTextEntry3] : [email or web address] [FreeTextEntry5] : English [FreeTextEntry6] : Cassy [FreeTextEntry7] : she/her [FreeTextEntry2] : IPP admission 10/5/24-10/31/24 due to psychosis, schizoaffective disorder. [FreeTextEntry1] : Medication management

## 2025-06-24 NOTE — REASON FOR VISIT
[Patient preference] : as per patient preference [Telehealth (audio & video) - Individual/Group] : This visit was provided via telehealth using real-time 2-way audio visual technology. [Other Location: e.g. Home (Enter Location, City,State)___] : The provider was located at [unfilled]. [Home] : The patient, [unfilled], was located at home, [unfilled], at the time of the visit. [Verbal consent obtained from patient/other participant(s)] : Verbal consent for telehealth/telephonic services obtained from patient/other participant(s) [FreeTextEntry4] : 1:30PM [Number can be texted] : number can be texted [OK  to leave message] : OK  to leave message [FreeTextEntry3] : [email or web address] [FreeTextEntry5] : English [FreeTextEntry6] : Cassy [FreeTextEntry7] : she/her [Psychiatric Inpatient] : Psychiatric Inpatient [[facility] Provider/Facility] : [facility] Provider/Facility [Patient] : Patient [Prior Medical Records] : Prior Medical Records [Collateral - Name/Contact Info/Relationship:___] : Collateral: [unfilled] [FreeTextEntry2] : IPP admission 10/5/24-10/31/24 due to psychosis, schizoaffective disorder. [FreeTextEntry1] : Medication management

## 2025-06-24 NOTE — SOCIAL HISTORY
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 Developmental History: Milestones met within normal limits. Social Supports: Spouse, parents, siblings and friends. Meaningful Activities: Skiing, softball, volleyball, flag football-6 years ago, dancing, not actively engaging in these activities at present time, Race/Ethnicity: /Japanese American Sexual Identity: Heterosexual,  to her spouse since 2019. Spirituality/Mormon: Sabianist.    Spiritual Assessment  What is your elayne or belief? Sabianist. Do you consider yourself spiritual or Congregational? Church but not practicing but I do pray. Is there something you believe in that gives meaning to your life? God. Is it important in your life? Yes. What influence does it have on how you take care of yourself? Being a good citizen, supporting others. How have your beliefs influenced your behavior during this illness? Helpful, hopeful, brings me moses to help others, that's my selfcare. What role do your beliefs play in regaining your health? Very much so, always put God first. Are you part of a spiritual or Congregational community? No. Is this of support to you and how? N/A Is there a person or group of people you really love or who are really important to you? , parents, siblings, son all supportive. We have been discussing your belief and supports. What else gives you internal support? Praying, happiness, doing good deeds. What are your sources of hope, strength, comfort and peace? My family, my mom is Pentecostal and reminds me to pray, I went to Pentec[employer]al school. What do you hold on to during difficult times? God, guardian kevin, nixon, Geraldine mother of God, I have a Geraldine medal my mom gave me.

## 2025-06-24 NOTE — PLAN
[FreeTextEntry4] : Maintain functional and mood stability. [FreeTextEntry5] : -continue seroquel 200mg 2 tabs for TTD 400mg po qd -f/u with provider in 4 weeks -go to ED for urgent issues

## 2025-06-24 NOTE — HISTORY OF PRESENT ILLNESS
[FreeTextEntry1] : Today, pt presents for completion of intake evaluation with psychiatrist. States she has been "good" since being discharged, however that she has not started work which she cites as major stressor. Notes when she is at work, the lights there bother her, triggering her to believe she is on a special mission to help the  keep the country safe. Notes that she still has these thoughts at times, though currently she states she is able to understand this is not the case, but when she is walking on the streets or riding in a car, for example, that she'll feel like people are trying to direct her in one direction or another and she struggles with who she should listen and pay attention to. Denies AVH, denies other paranoid thoughts. Reports she is sleeping and eating well, denies low mood or other depressive, anxious or manic symptoms at this time.   Per pt's , Mason, [phone], reports Cassy continues to not be herself, stating "her mind and her body are not present", reports difficulty concentrating while having a conversation with her, however denies she is a harm to herself or others. Notes she is completing ADLs and caring for herself. Sleeping and eating well. Reports work to be stressor for her, but denies other acute stressors/trigger. Is not aware why pt decided to stop taking medication.   Per initial intake appointment: Patient is a 44-year-old  female of American/Pashto descent, , domiciled with her spouse of 5 years and 3.5-year-old son in [place]. Upon discharge from [facility] unit, she has been staying with her parents in [place], along with her son, temporarily as she reports that her house is being remodeled due to having mold. She notes that her spouse is staying in a hotel in [place] as he works as a contractor on [place]. Patient was admitted to [facility] on 10/5/24-10/31-24 due to a psychotic episode, in which she stole a car to reportedly look for signs from God and for a guardian Steffen "I feel connected to God and guardian [place]". Patient reportedly stopped taking her medication from 8/13-10/4/24 "it brings me down, the lightening system in the school bothered me". She reports that her spouse face timed her and met her where she was at the time "he told me to pull over at the ". Patient denies A/V hallucinations. However, she admits to having paranoia "of worldly things that may happen". She reports "sometimes feel like I'm helping the  defeat worldly events, worried [facility] is a target, a lot going on with the migrants, a lot of things concern me". Patient reports having previous IPP admissions at [facility]: [place] in 2011"I was considered missing" and in 2020 an ED visit at [facility] due to paranoia, psychosis "they released me". PMH: Chiari Malformation (blood clotting disorder), patient reports having 3 miscarriages on 9/2019,1/2020 and 4/2014. Patient reports previous outpatient treatment providers as: Mamta Whitaker NP (5 years), virtual sessions and therapist: Tonya Gonzalez (4 months July-October). Current medication: Benztropine 1mg at bedtime, Fluphenazine 2.5mg, and 5mg at bedtime. She reports history of being on Latuda for 10 years. Patient denies history of suicide attempts or self-injurious behavior. She denies history of drug or alcohol use, vaping or tobacco use. She denies history of trauma. No legal issues reported "the  did not press charges it was his car". Patient reports family history of depression-paternal uncle, details unknown. She denies family history of substance abuse or suicide attempts. PHQ9 (score 2, minimal depression, negative) and ANDRE (score o, mild anxiety, somewhat difficult) scales completed with patient. Patient denies suicidal/homicidal ideation, plan or intent. Patient offered IOP services as she states that she is considering taking some time off from work and returning February 2025. Patient also informed that she could join an IOP group once a week for additional support, on a day she is not receiving any other services, and she was receptive to considering this option. She identifies treatment goals as ""I want to work on communication and trying to feel safe in the outside world especially". She notes being a Mandaeism and prays daily. She notes adherence with medication regimen, since discharge from IPP, with good appetite and sleep hygiene. Patient's mother escorted patient to intake this morning and stood in the waiting area, until intake was complete. [FreeTextEntry2] : Session began at 9:17am. Session ended at 10:25am. Patient declined [facility] referral.  All consents have been signed by patient including HIPPA release form for her Spouse: Phi Edwards [phone] and mother: Marlee Xie [phone]. Patient is a 44-year-old  female of American/Estonian descent, , domiciled with her spouse of 5 years and 3.5-year-old son in [place]. Upon discharge from IPP unit, she has been staying with her parents in [place], along with her son, temporarily as she reports that her house is being remodeled due to having mold. She notes that her spouse is staying in a hotel in [place] as he works as a contractor on [place]. Patient was admitted to [facility] on 10/5/24-10/31-24 due to a psychotic episode, in which she stole a car to reportedly look for signs from God and for a guardian Steffen "I feel connected to God and guardian [place]". Patient reportedly stopped taking her medication from 8/13-10/4/24 "it brings me down, the lightening system in the school bothered me". She reports that her spouse face timed her and met her where she was at the time "he told me to pull over at the ". Patient denies A/V hallucinations. However, she admits to having paranoia "of worldly things that may happen". She reports "sometimes feel like I'm helping the  defeat worldly events, worried [facility] is a target, a lot going on with the migrants, a lot of things concern me". Patient reports having previous IPP admissions at [facility]: [place] in 2011"I was considered missing" and in 2020 an ED visit at [facility] due to paranoia, psychosis "they released me". PMH: Chiari Malformation (blood clotting disorder), patient reports having 3 miscarriages on 9/2019,1/2020 and 4/2014. Patient reports previous outpatient treatment providers as: Mamta Whitaker NP (5 years), virtual sessions and therapist: Tonya Gonzalez (4 months July-October). Current medication: Benztropine 1mg at bedtime, Fluphenazine 2.5mg, and 5mg at bedtime. She reports history of being on Latuda for 10 years. Patient denies history of suicide attempts or self-injurious behavior. She denies history of drug or alcohol use, vaping or tobacco use. She denies history of trauma. No legal issues reported "the  did not press charges it was his car". Patient reports family history of depression-paternal uncle, details unknown. She denies family history of substance abuse or suicide attempts. PHQ9 (score 2, minimal depression, negative) and ANDRE (score o, mild anxiety, somewhat difficult) scales completed with patient. Patient denies suicidal/homicidal ideation, plan or intent. Patient offered IOP services as she states that she is considering taking some time off from work and returning February 2025. Patient also informed that she could join an IOP group once a week for additional support, on a day she is not receiving any other services, and she was receptive to considering this option. She identifies treatment goals as ""I want to work on communication and trying to feel safe in the outside world especially". She notes being a Christianity and prays daily. She notes adherence with medication regimen, [facility], with good appetite and sleep hygiene. Patient's mother escorted patient to intake this morning and stood in the waiting area, until intake was complete.

## 2025-06-24 NOTE — PHYSICAL EXAM
[Well groomed] : well groomed [Cooperative] : cooperative [Euthymic] : euthymic [Full] : full [Clear] : clear [Linear/Goal Directed] : linear/goal directed [None] : none [None Reported] : none reported [Average] : average [WNL] : within normal limits [3 - Mildly ill] : 3 - Mildly ill  (clearly established symptoms with minimal, if any, distress or difficulty in social and occupational function) [2 - Much improved] : 2 - Much improved  (notably better with significant reduction of symptoms; increase in the level of functioning but some symptoms remain)

## 2025-06-24 NOTE — PSYCHOSOCIAL ASSESSMENT
[None known] : None known [FreeTextEntry2] : N/A [Other: _____] : [unfilled] [Competitive and integrated employment] : Competitive and integrated employment [35 hours or more] : 35 hours or more [Earned income] : earned income [Financially stable] : financially stable [None] : none [Private residence (home, apartment, rooming house, hotel, motel, supported housing, supported Single Room Occupancy (SRO),] : Private residence (home, apartment, rooming house, hotel, motel, supported housing, supported Single Room Occupancy (SRO), permanent housing programs, transient housing programs, and shelter plus care housing) [Client's spouse or domestic partner] : client's spouse or domestic partner [Yes] : yes [N/A] : n/a [Spouse/Partner] : spouse/partner [Mother] : mother [Good] : good [Lives with Family Member] : lives with family member [No] : Patient has personal representation (legal guardian, representative payee, conservatorship)? No [FreeTextEntry6] : Patient reports that she is currently staying with her parents in [place] along with her son, while her home is being remodeled "we found out we have mold". [FreeTextEntry7] : Spouse: Phi Edwards  [phone] [FreeTextEntry8] : Mother: Marlee DePinto [phone] [FreeTextEntry1] : Patient reports she is staying with her parents temporarily, while her home in [place] is being renovated "we have mold".

## 2025-06-24 NOTE — RISK ASSESSMENT
[Clinical Records] : Clinical Records [Clinical Interview] : Clinical Interview [No] : No [Psychotic disorder] : psychotic disorder [Psychosis] : psychosis [History of Impulsivity] : history of impulsivity [Recent inpatient discharge] : recent inpatient discharge [Triggering events leading to humiliation, shame, and/or despair] : triggering events leading to humiliation, shame, and/or despair (e.g. loss of relationship, financial or health status) (real or anticipated) [Identifies reasons for living] : identifies reasons for living [Supportive social network of family or friends] : supportive social network of family or friends [Gnosticism beliefs] : Episcopal beliefs [Cultural, spiritual and/or moral attitudes against suicide] : cultural, spiritual and/or moral attitudes against suicide [Positive therapeutic relationships] : positive therapeutic relationships [Responsibility to children, family, or others] : responsibility to children, family, or others [Engaged in work or school] : engaged in work or school [None in the patient's lifetime] : None in the patient's lifetime [None Known] : none known [Feeling of being under threat and being unable to control threat] : feeling of being under threat and being unable to control threat [Affective dysregulation] : affective dysregulation [Residential stability] : residential stability [Relationship stability] : relationship stability [Employment stability] : employment stability [Engagement in treatment] : engagement in treatment [Yes] : yes [de-identified] : Patient denies access to firearms. [No, patient denies ideation or behavior] : No, patient denies ideation or behavior [FreeTextEntry8] :  Pt is at low acute risk for self harm given no current SI, no past SI/SA or other self harming behavior, has strong family and social support, has housing and financial stability, has good access to care, is engaged in care, is currently medication compliant, has no current or past substance use history, has no access to guns, and is able to engage in safety planning.

## 2025-06-24 NOTE — FAMILY HISTORY
[FreeTextEntry1] : Family Composition: Patient is  to her spouse of 6 years, and she has a 4-year-old son. Family History and Background: Patient was born in [place], raised by her parents, along with her older brother and younger sister. Family Relationship: Patient reports having a "good" relationship with her family. Pertinent Family Medical, MH and Substance Use History including Adult Child of Alcoholic and child of Substance abuse status; history of cancer and heart disease:  Patient denies family history of suicide attempts or substance abuse. She reports her paternal uncle suffered from depression; details unknown.  Mother: HTN. Father: HTN.

## 2025-07-22 NOTE — PLAN
[FreeTextEntry4] : Maintain functional and mood stability. [FreeTextEntry5] : -Pt to transfer care to provider in [place] -Continue current regimen

## 2025-07-22 NOTE — REASON FOR VISIT
[Number can be texted] : number can be texted [OK  to leave message] : OK  to leave message [FreeTextEntry3] : [email or web address] [FreeTextEntry6] : Cassy [FreeTextEntry7] : she/her [Patient seeking care elsewhere] : Patient seeking care elsewhere [Patient preference] : as per patient preference [Telehealth (audio & video) - Individual/Group] : This visit was provided via telehealth using real-time 2-way audio visual technology. [Other Location: e.g. Home (Enter Location, City,State)___] : The provider was located at [unfilled]. [Home] : The patient, [unfilled], was located at home, [unfilled], at the time of the visit. [Verbal consent obtained from patient/other participant(s)] : Verbal consent for telehealth/telephonic services obtained from patient/other participant(s) [FreeTextEntry4] : 1:30PM [FreeTextEntry5] : 2:15PM [Psychiatric Inpatient] : Psychiatric Inpatient [[facility] Provider/Facility] : [facility] Provider/Facility [Patient] : Patient [Prior Medical Records] : Prior Medical Records [Collateral - Name/Contact Info/Relationship:___] : Collateral: [unfilled] [FreeTextEntry2] : IPP admission 10/5/24-10/31/24 due to psychosis, schizoaffective disorder. [FreeTextEntry1] : Medication management

## 2025-07-22 NOTE — HISTORY OF PRESENT ILLNESS
[FreeTextEntry2] : Session began at 9:17am. Session ended at 10:25am. Patient declined [facility] referral.  All consents have been signed by patient including HIPPA release form for her Spouse: Phi Edwards [phone] and mother: Marlee Xie [phone]. Patient is a 44-year-old  female of American/Mongolian descent, , domiciled with her spouse of 5 years and 3.5-year-old son in [place]. Upon discharge from IPP unit, she has been staying with her parents in [place], along with her son, temporarily as she reports that her house is being remodeled due to having mold. She notes that her spouse is staying in a hotel in [place] as he works as a contractor on [place]. Patient was admitted to [facility] on 10/5/24-10/31-24 due to a psychotic episode, in which she stole a car to reportedly look for signs from God and for a guardian [place] "I feel connected to God and guardian [place]". Patient reportedly stopped taking her medication from 8/13-10/4/24 "it brings me down, the lightening system in the school bothered me". She reports that her spouse face timed her and met her where she was at the time "he told me to pull over at the ". Patient denies A/V hallucinations. However, she admits to having paranoia "of worldly things that may happen". She reports "sometimes feel like I'm helping the  defeat worldly events, worried [facility] is a target, a lot going on with the migrants, a lot of things concern me". Patient reports having previous IPP admissions at [facility]: [place] in 2011"I was considered missing" and in 2020 an ED visit at [facility] due to paranoia, psychosis "they released me". PMH: Chiari Malformation (blood clotting disorder), patient reports having 3 miscarriages on 9/2019,1/2020 and 4/2014. Patient reports previous outpatient treatment providers as: Mamta Whitaker NP (5 years), virtual sessions and therapist: Tonya Gonzalez (4 months July-October). Current medication: Benztropine 1mg at bedtime, Fluphenazine 2.5mg, and 5mg at bedtime. She reports history of being on Latuda for 10 years. Patient denies history of suicide attempts or self-injurious behavior. She denies history of drug or alcohol use, vaping or tobacco use. She denies history of trauma. No legal issues reported "the  did not press charges it was his car". Patient reports family history of depression-paternal uncle, details unknown. She denies family history of substance abuse or suicide attempts. PHQ9 (score 2, minimal depression, negative) and ANDRE (score o, mild anxiety, somewhat difficult) scales completed with patient. Patient denies suicidal/homicidal ideation, plan or intent. Patient offered IOP services as she states that she is considering taking some time off from work and returning February 2025. Patient also informed that she could join an IOP group once a week for additional support, on a day she is not receiving any other services, and she was receptive to considering this option. She identifies treatment goals as ""I want to work on communication and trying to feel safe in the outside world especially". She notes being a Judaism and prays daily. She notes adherence with medication regimen, since discharge from [facility], with good appetite and sleep hygiene. Patient's mother escorted patient to intake this morning and stood in the waiting area, until intake was complete. [FreeTextEntry1] : Patient seen and examined in-person. Reports that since taking the previously dosed seroquel (200mg tabs) that she has been feeling better, like herself. Denies having any misinterpretations of incidents or events as having direct personal reference to herself. Denies paranoid thoughts. States her mood has been at baseline, and that she has been attending work virtually and functioning well. Reports she will go on a trip this weekend with family that she is looking forward to. She reports she has been eating and sleeping at baseline, and without acute new onset or other acute exacerbations in symptoms. She denies SI/HI. Reports she has been compliant with medications and denies acute negative side effects. Denies acute use of substances. Offers no other complaints at this time.  Per erin (), Cassy has improved upon re-starting previous seroquel dosing, and has been functioning well. Denies any acute concerns including any safety concerns for herself or others at this time.

## 2025-07-22 NOTE — SOCIAL HISTORY
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 Developmental History: Milestones met within normal limits. Social Supports: Spouse, parents, siblings and friends. Meaningful Activities: Skiing, softball, volleyball, flag football-6 years ago, dancing, not actively engaging in these activities at present time, Race/Ethnicity: /Khmer American Sexual Identity: Heterosexual,  to her spouse since 2019. Spirituality/Quaker: Druze.    Spiritual Assessment  What is your elayne or belief? Druze. Do you consider yourself spiritual or Rastafari? Gnosticist but not practicing but I do pray. Is there something you believe in that gives meaning to your life? God. Is it important in your life? Yes. What influence does it have on how you take care of yourself? Being a good citizen, supporting others. How have your beliefs influenced your behavior during this illness? Helpful, hopeful, brings me moses to help others, that's my selfcare. What role do your beliefs play in regaining your health? Very much so, always put God first. Are you part of a spiritual or Rastafari community? No. Is this of support to you and how? N/A Is there a person or group of people you really love or who are really important to you? , parents, siblings, son all supportive. We have been discussing your belief and supports. What else gives you internal support? Praying, happiness, doing good deeds. What are your sources of hope, strength, comfort and peace? My family, my mom is Yarsanism and reminds me to pray, I went to Hinduism school. What do you hold on to during difficult times? God, guardian kevin, nixon, Geraldine mother of God, I have a Geraldine medal my mom gave me.

## 2025-07-22 NOTE — RISK ASSESSMENT
[Clinical Records] : Clinical Records [Clinical Interview] : Clinical Interview [No] : No [Psychotic disorder] : psychotic disorder [Psychosis] : psychosis [History of Impulsivity] : history of impulsivity [Recent inpatient discharge] : recent inpatient discharge [Triggering events leading to humiliation, shame, and/or despair] : triggering events leading to humiliation, shame, and/or despair (e.g. loss of relationship, financial or health status) (real or anticipated) [Identifies reasons for living] : identifies reasons for living [Supportive social network of family or friends] : supportive social network of family or friends [Zoroastrianism beliefs] : Confucianist beliefs [Cultural, spiritual and/or moral attitudes against suicide] : cultural, spiritual and/or moral attitudes against suicide [Positive therapeutic relationships] : positive therapeutic relationships [Responsibility to children, family, or others] : responsibility to children, family, or others [Engaged in work or school] : engaged in work or school [None in the patient's lifetime] : None in the patient's lifetime [None Known] : none known [Feeling of being under threat and being unable to control threat] : feeling of being under threat and being unable to control threat [Affective dysregulation] : affective dysregulation [Residential stability] : residential stability [Relationship stability] : relationship stability [Employment stability] : employment stability [Engagement in treatment] : engagement in treatment [Yes] : yes [de-identified] : Patient denies access to firearms. [No, patient denies ideation or behavior] : No, patient denies ideation or behavior [FreeTextEntry8] :  Pt is at low acute risk for self harm given no current SI, no past SI/SA or other self harming behavior, has strong family and social support, has housing and financial stability, has good access to care, is engaged in care, is currently medication compliant, has no current or past substance use history, has no access to guns, and is able to engage in safety planning.

## 2025-07-22 NOTE — PSYCHOSOCIAL ASSESSMENT
[None known] : None known [FreeTextEntry2] : N/A [Other: _____] : [unfilled] [Competitive and integrated employment] : Competitive and integrated employment [35 hours or more] : 35 hours or more [Earned income] : earned income [Financially stable] : financially stable [None] : none [Private residence (home, apartment, rooming house, hotel, motel, supported housing, supported Single Room Occupancy (SRO),] : Private residence (home, apartment, rooming house, hotel, motel, supported housing, supported Single Room Occupancy (SRO), permanent housing programs, transient housing programs, and shelter plus care housing) [Client's spouse or domestic partner] : client's spouse or domestic partner [Yes] : yes [N/A] : n/a [Spouse/Partner] : spouse/partner [Mother] : mother [Good] : good [Lives with Family Member] : lives with family member [No] : Patient has personal representation (legal guardian, representative payee, conservatorship)? No [FreeTextEntry6] : Patient reports that she is currently staying with her parents in [place] along with her son, while her home is being remodeled "we found out we have mold". [FreeTextEntry7] : Spouse: Phi Edwards  [phone] [FreeTextEntry8] : Mother: Marlee DePinto [phone] [FreeTextEntry1] : Patient reports she is staying with her parents temporarily, while her home in [place] is being renovated "we have mold".

## 2025-07-22 NOTE — FAMILY HISTORY
[FreeTextEntry1] : Family Composition: Patient is  to her spouse of 6 years, and she has a 4-year-old son. Family History and Background: Patient was born in [place], raised by her parents, along with her older brother and younger sister. Family Relationship: Patient reports having a "good" relationship with her family. Pertinent Family Medical, MH and Substance Use History including Adult Child of Alcoholic and child of Substance abuse status; history of cancer and heart disease:  Patient denies family history of suicide attempts or substance abuse. She reports her paternal uncle suffered from depression; details unknown.  Mother: HTN. Father: HTN.